# Patient Record
Sex: FEMALE | Race: WHITE | URBAN - METROPOLITAN AREA
[De-identification: names, ages, dates, MRNs, and addresses within clinical notes are randomized per-mention and may not be internally consistent; named-entity substitution may affect disease eponyms.]

---

## 2017-07-08 ENCOUNTER — EMERGENCY (EMERGENCY)
Facility: HOSPITAL | Age: 52
LOS: 1 days | Discharge: HOME | End: 2017-07-08
Attending: EMERGENCY MEDICINE
Payer: COMMERCIAL

## 2017-07-08 VITALS
RESPIRATION RATE: 16 BRPM | HEART RATE: 75 BPM | SYSTOLIC BLOOD PRESSURE: 157 MMHG | DIASTOLIC BLOOD PRESSURE: 87 MMHG | OXYGEN SATURATION: 97 % | TEMPERATURE: 99 F

## 2017-07-08 VITALS
HEART RATE: 75 BPM | OXYGEN SATURATION: 99 % | DIASTOLIC BLOOD PRESSURE: 77 MMHG | SYSTOLIC BLOOD PRESSURE: 129 MMHG | TEMPERATURE: 98 F | RESPIRATION RATE: 18 BRPM

## 2017-07-08 LAB
ALBUMIN SERPL ELPH-MCNC: 4.3 G/DL — SIGNIFICANT CHANGE UP (ref 3.3–5)
ALP SERPL-CCNC: 60 U/L — SIGNIFICANT CHANGE UP (ref 40–120)
ALT FLD-CCNC: 18 U/L RC — SIGNIFICANT CHANGE UP (ref 10–45)
ANION GAP SERPL CALC-SCNC: 12 MMOL/L — SIGNIFICANT CHANGE UP (ref 5–17)
APPEARANCE UR: CLEAR — SIGNIFICANT CHANGE UP
AST SERPL-CCNC: 21 U/L — SIGNIFICANT CHANGE UP (ref 10–40)
BASE EXCESS BLDV CALC-SCNC: 1.1 MMOL/L — SIGNIFICANT CHANGE UP (ref -2–2)
BASOPHILS # BLD AUTO: 0 K/UL — SIGNIFICANT CHANGE UP (ref 0–0.2)
BASOPHILS NFR BLD AUTO: 0.5 % — SIGNIFICANT CHANGE UP (ref 0–2)
BILIRUB SERPL-MCNC: 0.3 MG/DL — SIGNIFICANT CHANGE UP (ref 0.2–1.2)
BILIRUB UR-MCNC: NEGATIVE — SIGNIFICANT CHANGE UP
BUN SERPL-MCNC: 14 MG/DL — SIGNIFICANT CHANGE UP (ref 7–23)
CA-I SERPL-SCNC: 1.23 MMOL/L — SIGNIFICANT CHANGE UP (ref 1.12–1.3)
CALCIUM SERPL-MCNC: 9.5 MG/DL — SIGNIFICANT CHANGE UP (ref 8.4–10.5)
CHLORIDE BLDV-SCNC: 106 MMOL/L — SIGNIFICANT CHANGE UP (ref 96–108)
CHLORIDE SERPL-SCNC: 103 MMOL/L — SIGNIFICANT CHANGE UP (ref 96–108)
CO2 BLDV-SCNC: 29 MMOL/L — SIGNIFICANT CHANGE UP (ref 22–30)
CO2 SERPL-SCNC: 25 MMOL/L — SIGNIFICANT CHANGE UP (ref 22–31)
COLOR SPEC: YELLOW — SIGNIFICANT CHANGE UP
CREAT SERPL-MCNC: 0.89 MG/DL — SIGNIFICANT CHANGE UP (ref 0.5–1.3)
D DIMER BLD IA.RAPID-MCNC: <150 NG/ML DDU — SIGNIFICANT CHANGE UP
DIFF PNL FLD: NEGATIVE — SIGNIFICANT CHANGE UP
EOSINOPHIL # BLD AUTO: 0 K/UL — SIGNIFICANT CHANGE UP (ref 0–0.5)
EOSINOPHIL NFR BLD AUTO: 0.2 % — SIGNIFICANT CHANGE UP (ref 0–6)
EPI CELLS # UR: SIGNIFICANT CHANGE UP /HPF
GAS PNL BLDV: 139 MMOL/L — SIGNIFICANT CHANGE UP (ref 136–145)
GAS PNL BLDV: SIGNIFICANT CHANGE UP
GLUCOSE BLDV-MCNC: 95 MG/DL — SIGNIFICANT CHANGE UP (ref 70–99)
GLUCOSE SERPL-MCNC: 98 MG/DL — SIGNIFICANT CHANGE UP (ref 70–99)
GLUCOSE UR QL: NEGATIVE — SIGNIFICANT CHANGE UP
HCG UR QL: NEGATIVE — SIGNIFICANT CHANGE UP
HCO3 BLDV-SCNC: 27 MMOL/L — SIGNIFICANT CHANGE UP (ref 21–29)
HCT VFR BLD CALC: 43.8 % — SIGNIFICANT CHANGE UP (ref 34.5–45)
HCT VFR BLDA CALC: 45 % — SIGNIFICANT CHANGE UP (ref 39–50)
HGB BLD CALC-MCNC: 14.8 G/DL — SIGNIFICANT CHANGE UP (ref 11.5–15.5)
HGB BLD-MCNC: 14.2 G/DL — SIGNIFICANT CHANGE UP (ref 11.5–15.5)
KETONES UR-MCNC: NEGATIVE — SIGNIFICANT CHANGE UP
LACTATE BLDV-MCNC: 0.7 MMOL/L — SIGNIFICANT CHANGE UP (ref 0.7–2)
LEUKOCYTE ESTERASE UR-ACNC: ABNORMAL
LIDOCAIN IGE QN: 52 U/L — SIGNIFICANT CHANGE UP (ref 7–60)
LYMPHOCYTES # BLD AUTO: 1.3 K/UL — SIGNIFICANT CHANGE UP (ref 1–3.3)
LYMPHOCYTES # BLD AUTO: 15.2 % — SIGNIFICANT CHANGE UP (ref 13–44)
MCHC RBC-ENTMCNC: 30 PG — SIGNIFICANT CHANGE UP (ref 27–34)
MCHC RBC-ENTMCNC: 32.5 GM/DL — SIGNIFICANT CHANGE UP (ref 32–36)
MCV RBC AUTO: 92.4 FL — SIGNIFICANT CHANGE UP (ref 80–100)
MONOCYTES # BLD AUTO: 0.4 K/UL — SIGNIFICANT CHANGE UP (ref 0–0.9)
MONOCYTES NFR BLD AUTO: 4.3 % — SIGNIFICANT CHANGE UP (ref 2–14)
NEUTROPHILS # BLD AUTO: 7 K/UL — SIGNIFICANT CHANGE UP (ref 1.8–7.4)
NEUTROPHILS NFR BLD AUTO: 79.8 % — HIGH (ref 43–77)
NITRITE UR-MCNC: NEGATIVE — SIGNIFICANT CHANGE UP
PCO2 BLDV: 53 MMHG — HIGH (ref 35–50)
PH BLDV: 7.34 — LOW (ref 7.35–7.45)
PH UR: 6.5 — SIGNIFICANT CHANGE UP (ref 5–8)
PLATELET # BLD AUTO: 216 K/UL — SIGNIFICANT CHANGE UP (ref 150–400)
PO2 BLDV: 22 MMHG — LOW (ref 25–45)
POTASSIUM BLDV-SCNC: 3.8 MMOL/L — SIGNIFICANT CHANGE UP (ref 3.5–5)
POTASSIUM SERPL-MCNC: 4.1 MMOL/L — SIGNIFICANT CHANGE UP (ref 3.5–5.3)
POTASSIUM SERPL-SCNC: 4.1 MMOL/L — SIGNIFICANT CHANGE UP (ref 3.5–5.3)
PROT SERPL-MCNC: 7.7 G/DL — SIGNIFICANT CHANGE UP (ref 6–8.3)
PROT UR-MCNC: NEGATIVE — SIGNIFICANT CHANGE UP
RBC # BLD: 4.74 M/UL — SIGNIFICANT CHANGE UP (ref 3.8–5.2)
RBC # FLD: 11.7 % — SIGNIFICANT CHANGE UP (ref 10.3–14.5)
RBC CASTS # UR COMP ASSIST: SIGNIFICANT CHANGE UP /HPF (ref 0–2)
SAO2 % BLDV: 28 % — LOW (ref 67–88)
SODIUM SERPL-SCNC: 140 MMOL/L — SIGNIFICANT CHANGE UP (ref 135–145)
SP GR SPEC: >1.03 — HIGH (ref 1.01–1.02)
UROBILINOGEN FLD QL: NEGATIVE — SIGNIFICANT CHANGE UP
WBC # BLD: 8.7 K/UL — SIGNIFICANT CHANGE UP (ref 3.8–10.5)
WBC # FLD AUTO: 8.7 K/UL — SIGNIFICANT CHANGE UP (ref 3.8–10.5)
WBC UR QL: SIGNIFICANT CHANGE UP /HPF (ref 0–5)

## 2017-07-08 PROCEDURE — 74177 CT ABD & PELVIS W/CONTRAST: CPT | Mod: 26

## 2017-07-08 PROCEDURE — 99285 EMERGENCY DEPT VISIT HI MDM: CPT

## 2017-07-08 RX ORDER — ACETAMINOPHEN 500 MG
975 TABLET ORAL ONCE
Refills: 0 | Status: COMPLETED | OUTPATIENT
Start: 2017-07-08 | End: 2017-07-08

## 2017-07-08 RX ORDER — ACETAMINOPHEN 500 MG
1000 TABLET ORAL ONCE
Refills: 0 | Status: DISCONTINUED | OUTPATIENT
Start: 2017-07-08 | End: 2017-07-08

## 2017-07-08 RX ADMIN — Medication 975 MILLIGRAM(S): at 13:57

## 2017-07-08 RX ADMIN — Medication 975 MILLIGRAM(S): at 12:44

## 2017-07-08 NOTE — ED PROVIDER NOTE - CARE PLAN
Principal Discharge DX:	Left upper quadrant abdominal tenderness  Instructions for follow-up, activity and diet:	1. You may take Motrin 600mg every 6 hours or Tylenol 650mg every 6 hours as needed for pain.  2. Follow up with your Primary Care Physician as soon as possible for further evaluation.   3. Return to the Emergency Department for any concerning symptoms.

## 2017-07-08 NOTE — ED PROVIDER NOTE - PLAN OF CARE
1. You may take Motrin 600mg every 6 hours or Tylenol 650mg every 6 hours as needed for pain.  2. Follow up with your Primary Care Physician as soon as possible for further evaluation.   3. Return to the Emergency Department for any concerning symptoms.

## 2017-07-08 NOTE — ED PROVIDER NOTE - ATTENDING CONTRIBUTION TO CARE
Private Physician Abdoulaye Acharya  52y female No allergy,habits, Taking ocp, mvi, Pt complains of severe left flank pain past 10d, No nvdc. Worsened overnight., no fever chills. Has not sought care until today. Seen urgent care and referred to ed. PE WDWN female awake alert speech fluent normocephalic atraumatic chest clear anterior & posterior abd Mod ttp left upper quad. +bs no rebound, rash, No cvat. Neuro focal defects  Ivan Sanderson MD, Facep

## 2017-07-08 NOTE — ED PROVIDER NOTE - MEDICAL DECISION MAKING DETAILS
Subacute luq abd pain with tenderness ro splenic lesion, diverticuola dz. Less likely lower lobe pe if dimer neg can exclude this dx. Check labs ct re-eval.  Ivan Sanderson MD, Facep

## 2017-07-08 NOTE — ED ADULT NURSE NOTE - CHPI ED SYMPTOMS NEG
no abdominal distension/no blood in stool/no diarrhea/no dysuria/no nausea/no vomiting/no burning urination

## 2017-07-08 NOTE — ED ADULT NURSE NOTE - OBJECTIVE STATEMENT
51 y/o female arrived to ED c.o LUQ pain x10 days. Pt states that she has had this consistent pain but it worsened last night into this morning. Was seen in urgent care and sent to ED for further eval. Pt arrived a&ox4, tearful, neg fever/ chills,  neg sob, L/S clear bilat, neg chest pain, abd soft and tender in LUQ, + sharp pain, worse with movement, pain relieved when sitting up, neg change in urinary or bowel pattern,  neg nausea/ vomiting, pulse motor sensoryx4, skin warm dry intact 18g IV placed LAC. Will continue to monitor.

## 2017-07-08 NOTE — CONSULT NOTE ADULT - SUBJECTIVE AND OBJECTIVE BOX
CC: Patient is a 52y old female who presents with a chief complaint of abdominal pain x 10 days.      Patient is a 52 year old female with history of gastritis p/w LUQ pain. The pt reports symptoms over the past 10 days, worsening over the past 2-3 days, worsening today and is now severe. Pain is in the left upper quadrant/left lower ribcage, positional. Denies having similar pain in the past. Not associated with any fevers, nausea/vomiting or change in bowel habits. Patient notes pain with bearing down. The patient reports having URI/bronchitis 8 weeks ago for which she was on antibiotics, steroids. Seen at Urgent Care this morning and reportedly had a negative chest xray but was sent in with concern for splenic pathology. Denies trauma, but has been playing tennis recently, but stopped 2-3 days ago when pain got worse. Denies dysuria.    PMH  Polyp of colon 2014  Bronchitis 5/2017    PSH  cervical polyp excision 2013, 2016  L foot fracture 2005  R foot avulsion 2017    MEDS    Allergies    No Known Allergies    Intolerances        Social  Denies tobacco  Rare EtOH (1 drink/month)  Denies other illicits  Part-time teacher/      Physical Exam  T(C): 36.7 (07-08-17 @ 16:56), Max: 37.2 (07-08-17 @ 12:17)  HR: 62 (07-08-17 @ 16:56) (62 - 75)  BP: 115/78 (07-08-17 @ 16:56) (115/78 - 157/87)  RR: 18 (07-08-17 @ 16:56) (16 - 18)  SpO2: 100% (07-08-17 @ 16:56) (97% - 100%)  Wt(kg): --  Tmax: T(C): , Max: 37.2 (07-08-17 @ 12:17)  Wt(kg): --    Gen: NAD  HEENT: normocephalic, atraumatic, no scleral icterus  CV: S1, S2, RRR  Pulm: CTA B/L  Abd: Soft, ND, LUQ tender, no rebound, no palpable organomegaly/masses, other quadrants benign  Ext: warm, no edema, palp dp/pt      Labs:                        14.2   8.7   )-----------( 216      ( 08 Jul 2017 12:42 )             43.8     07-08    140  |  103  |  14  ----------------------------<  98  4.1   |  25  |  0.89    Ca    9.5      08 Jul 2017 12:42    TPro  7.7  /  Alb  4.3  /  TBili  0.3  /  DBili  x   /  AST  21  /  ALT  18  /  AlkPhos  60  07-08      Urinalysis Basic - ( 08 Jul 2017 14:14 )    Color: Yellow / Appearance: Clear / SG: >1.030 / pH: x  Gluc: x / Ketone: Negative  / Bili: Negative / Urobili: Negative   Blood: x / Protein: Negative / Nitrite: Negative   Leuk Esterase: Moderate / RBC: 0-2 /HPF / WBC 3-5 /HPF   Sq Epi: x / Non Sq Epi: OCC /HPF / Bacteria: x            Imaging:  IMPRESSION:   Colonic diverticula without evidence of diverticulitis.

## 2017-07-08 NOTE — ED ADULT NURSE REASSESSMENT NOTE - NS ED NURSE REASSESS COMMENT FT1
pt sleeping. states she is comfortable without movement. pt declining pain medication. provided with hot packs. VSS will continue to monitor.

## 2017-07-08 NOTE — ED PROVIDER NOTE - OBJECTIVE STATEMENT
52 y.o. female no PMHx p/w LUQ pain. Reports symptoms over the past 10 days, worsening over the last 24 hours 52 y.o. female no PMHx p/w LUQ pain. Reports symptoms over the past 10 days, worsening over the last 24 hours and is now severe. Localizes pain to the left upper quadrant/left lower ribcage. Pain is positional, worse with deep inspiration. She recovered from a URI several weeks ago for which she was on antibiotics. Seen at Urgent Care this morning and reportedly had a negative chest xray but was sent in with concern for splenic pathology. Denies fevers, chills, injury/trauma, dysuria, hematuria, nausea/vomiting.

## 2017-07-08 NOTE — ED PROVIDER NOTE - PROGRESS NOTE DETAILS
Patient refusing GI cocktail or other pain medication at this time. Awaiting surgery consult. Leonardo Peña PA-C. Ethiology  of symptoms not clear, Declined gi cocktail. Persistent ttp luq surg consult pending  Ivan Sanderson MD, Facep Patient seen at bedside by Surgery Attn Dr. Ulloa, stating patient is cleared for discharge from surgical standpoint. Will discuss w/ Dr. Noyola. Leonardo Peña PA-C.

## 2017-07-08 NOTE — CONSULT NOTE ADULT - ASSESSMENT
52 year old female with PMH gastritis, presents with 10 days of LUQ pain, no other GI symptoms, unlike previous gastritis pain, tender on exam, but normal WBC, and normal imaging on CT scan, unclear etiology of pain  - no acute surgical intervention indicated  - supportive care-pain control  - recommend GI f/u for history of gastritis  - will discuss with attending Artemio

## 2017-07-12 ENCOUNTER — TRANSCRIPTION ENCOUNTER (OUTPATIENT)
Age: 52
End: 2017-07-12

## 2018-03-16 ENCOUNTER — EMERGENCY (EMERGENCY)
Facility: HOSPITAL | Age: 53
LOS: 1 days | Discharge: HOME | End: 2018-03-16
Attending: INTERNAL MEDICINE | Admitting: INTERNAL MEDICINE
Payer: COMMERCIAL

## 2018-03-16 VITALS
HEIGHT: 65 IN | WEIGHT: 115.08 LBS | HEART RATE: 87 BPM | TEMPERATURE: 98 F | OXYGEN SATURATION: 99 % | RESPIRATION RATE: 16 BRPM | DIASTOLIC BLOOD PRESSURE: 82 MMHG | SYSTOLIC BLOOD PRESSURE: 158 MMHG

## 2018-03-16 PROCEDURE — 73110 X-RAY EXAM OF WRIST: CPT | Mod: 26,LT

## 2018-03-16 PROCEDURE — 73090 X-RAY EXAM OF FOREARM: CPT | Mod: 26,LT

## 2018-03-16 PROCEDURE — 99284 EMERGENCY DEPT VISIT MOD MDM: CPT

## 2018-03-16 NOTE — ED ADULT NURSE REASSESSMENT NOTE - NS ED NURSE REASSESS COMMENT FT1
Lt wrist cast applied and tolerating well, Patient reviewed discharge instruction and medication with RN, follow up information given, Patient understood and ambulated to dc.

## 2018-03-16 NOTE — ED ADULT NURSE NOTE - OBJECTIVE STATEMENT
Called back with verbal auth of orders.  --------------------------------------------------        ----- Message from Danielito Carias sent at 5/26/2017  1:14 PM CDT -----  Regarding: Verbal Order   Contact: [phone]  Tsering from [facility]    Requesting verbal order to continue weekly skilled nursing for the next 9 weeks, with 2 PRN visits.      Pt received awake, sitting on chair holding her left arm. Pt states she was playing tennis when she fell backward and attempted to break her fall with her left hand. Pt with limited hand and finger movement secondary to pain, sensation intact, no swelling of hand of fingers. Pt has wrist tenderness, severe pain with flexion attempt, swelling noted on the top and bottom of her wrist. Pt took Aleeve prior to arrival and is applying ice with a compression device.

## 2018-12-08 ENCOUNTER — EMERGENCY (EMERGENCY)
Facility: HOSPITAL | Age: 53
LOS: 1 days | End: 2018-12-08
Attending: EMERGENCY MEDICINE | Admitting: EMERGENCY MEDICINE
Payer: COMMERCIAL

## 2018-12-08 VITALS
WEIGHT: 119.93 LBS | RESPIRATION RATE: 16 BRPM | HEIGHT: 65 IN | OXYGEN SATURATION: 100 % | TEMPERATURE: 98 F | HEART RATE: 71 BPM | SYSTOLIC BLOOD PRESSURE: 138 MMHG | DIASTOLIC BLOOD PRESSURE: 82 MMHG

## 2018-12-08 VITALS
SYSTOLIC BLOOD PRESSURE: 122 MMHG | HEART RATE: 62 BPM | TEMPERATURE: 98 F | RESPIRATION RATE: 16 BRPM | OXYGEN SATURATION: 98 % | DIASTOLIC BLOOD PRESSURE: 65 MMHG

## 2018-12-08 PROCEDURE — 29515 APPLICATION SHORT LEG SPLINT: CPT

## 2018-12-08 PROCEDURE — 99283 EMERGENCY DEPT VISIT LOW MDM: CPT | Mod: 25

## 2018-12-08 PROCEDURE — 73630 X-RAY EXAM OF FOOT: CPT | Mod: 26,RT

## 2018-12-08 NOTE — ED PROVIDER NOTE - MEDICAL DECISION MAKING DETAILS
52 Y/O F WITH R FOOT PAIN S/P MISSED A STEP AT HOME AND HYPERFLEXING R FOOT/ANKLE, WILL GET XRAY OF FOOT, RE-ASSESS, SPLINT AND CRUTCHES IF FX, F/U ORTHO/PODIATRY.

## 2018-12-08 NOTE — ED PROVIDER NOTE - MUSCULOSKELETAL, MLM
R leg: +ttp dorsal aspect of R forefoot with mild bruising and swelling, skin intact, toes warm & mobile, cap refill<2sec, pulses and sensation intact, NVI, R ankle/tib-fib NT with FROM, achilles NT and intact, Spine appears normal

## 2018-12-08 NOTE — ED PROVIDER NOTE - PROGRESS NOTE DETAILS
Pt examined by ED attending, Dr. Canales who agreed with disposition and plan. X-rays reviewed with Dr. Canales. Pt with lateral R cuboid fracture. R foot placed in posterior splint, crutches given, pt to f/u with pvt ortho, Dr. Palomares.

## 2018-12-08 NOTE — ED PROVIDER NOTE - OBJECTIVE STATEMENT
52 y/o F presents with c/o R foot pain x 1 hour. Pt states that she missed a step in her house and hyperflexed her R foot and has been having pain since. Pt denies open wounds, numbness, tingling, head trauma, other injuries/symptoms. States that she took advil PTA and does not want any pain meds now.

## 2018-12-08 NOTE — ED PROVIDER NOTE - ATTENDING CONTRIBUTION TO CARE
Kalyan Canales MD: I have personally performed a face to face diagnostic evaluation on this patient.  I have reviewed the PA note and agree with the history, exam, and plan of care, except as noted.  History and Exam by me shows same findings as documented  Attending Note: Patient hyperflexed foot. Pain over dorsum of forefoot. Agree with plan

## 2018-12-08 NOTE — ED ADULT NURSE NOTE - OBJECTIVE STATEMENT
Pt states she missed a step and injured right ankle and foot prior to arrival. tenderness noted right ankle and foot. Swelling noted dorsum right foot. Positive pedal pulse.

## 2019-03-13 ENCOUNTER — TRANSCRIPTION ENCOUNTER (OUTPATIENT)
Age: 54
End: 2019-03-13

## 2019-10-07 ENCOUNTER — EMERGENCY (EMERGENCY)
Facility: HOSPITAL | Age: 54
LOS: 0 days | Discharge: HOME | End: 2019-10-07
Attending: EMERGENCY MEDICINE
Payer: COMMERCIAL

## 2019-10-07 VITALS
SYSTOLIC BLOOD PRESSURE: 153 MMHG | HEIGHT: 65 IN | HEART RATE: 66 BPM | RESPIRATION RATE: 18 BRPM | WEIGHT: 119.93 LBS | TEMPERATURE: 99 F | DIASTOLIC BLOOD PRESSURE: 91 MMHG | OXYGEN SATURATION: 100 %

## 2019-10-07 DIAGNOSIS — S01.01XA LACERATION WITHOUT FOREIGN BODY OF SCALP, INITIAL ENCOUNTER: ICD-10-CM
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
## 2019-10-07 PROCEDURE — 12001 RPR S/N/AX/GEN/TRNK 2.5CM/<: CPT

## 2019-10-07 PROCEDURE — 99283 EMERGENCY DEPT VISIT LOW MDM: CPT | Mod: 25

## 2019-10-07 PROCEDURE — 99283 EMERGENCY DEPT VISIT LOW MDM: CPT

## 2019-10-07 NOTE — ED STATDOCS - NSFOLLOWUPINSTRUCTIONS_ED_ALL_ED_FT
Laceration    WHAT YOU NEED TO KNOW:    A laceration is an injury to the skin and the soft tissue underneath it. Lacerations happen when you are cut or hit by something. They can happen anywhere on the body.     DISCHARGE INSTRUCTIONS:    Return to the emergency department if:     You have heavy bleeding or bleeding that does not stop after 10 minutes of holding firm, direct pressure over the wound.       Your wound opens up.     Contact your healthcare provider if:     You have a fever or chills.       Your laceration is red, warm, or swollen.      You have red streaks on your skin coming from your wound.      You have white or yellow drainage from the wound that smells bad.      You have pain that gets worse, even after treatment.       You have questions or concerns about your condition or care.     Medicines:     Prescription pain medicine may be given. Ask how to take this medicine safely.       Antibiotics help treat or prevent a bacterial infection.       Take your medicine as directed. Contact your healthcare provider if you think your medicine is not helping or if you have side effects. Tell him or her if you are allergic to any medicine. Keep a list of the medicines, vitamins, and herbs you take. Include the amounts, and when and why you take them. Bring the list or the pill bottles to follow-up visits. Carry your medicine list with you in case of an emergency.    Care for your wound as directed:     Do not get your wound wet until your healthcare provider says it is okay. Do not soak your wound in water. Do not go swimming until your healthcare provider says it is okay. Carefully wash the wound with soap and water. Gently pat the area dry or allow it to air dry.       Change your bandages when they get wet, dirty, or after washing. Apply new, clean bandages as directed. Do not apply elastic bandages or tape too tight. Do not put powders or lotions over your incision.       Apply antibiotic ointment as directed. Your healthcare provider may give you antibiotic ointment to put over your wound if you have stitches. If you have strips of tape over your incision, let them dry up and fall off on their own. If they do not fall off within 14 days, gently remove them. If you have glue over your wound, do not remove or pick at it. If your glue comes off, do not replace it with glue that you have at home.       Check your wound every day for signs of infection such as swelling, redness, or pus.     Self-care:     Apply ice on your wound for 15 to 20 minutes every hour or as directed. Use an ice pack, or put crushed ice in a plastic bag. Cover it with a towel. Ice helps prevent tissue damage and decreases swelling and pain.      Use a splint as directed. A splint will decrease movement and stress on your wound. It may help it heal faster. A splint may be used for lacerations over joints or areas of your body that bend. Ask your healthcare provider how to apply and remove a splint.       Decrease scarring of your wound by applying ointments as directed. Do not apply ointments until your healthcare provider says it is okay. You may need to wait until your wound is healed. Ask which ointment to buy and how often to use it. After your wound is healed, use sunscreen over the area when you are out in the sun. You should do this for at least 6 months to 1 year after your injury.     Follow up with your healthcare provider as directed: You may need to follow up in 24 to 48 hours to have your wound checked for infection. You will need to return in 7-10 days if you have stitches or staples so they can be removed. Care for your wound as directed to prevent infection and help it heal. Write down your questions so you remember to ask them during your visits.

## 2019-10-07 NOTE — ED STATDOCS - PATIENT PORTAL LINK FT
You can access the FollowMyHealth Patient Portal offered by [facility] by registering at the following website: [email or web address]. By joining [employer]’s FollowMyHealth portal, you will also be able to view your health information using other applications (apps) compatible with our system.

## 2019-10-07 NOTE — ED STATDOCS - PROGRESS NOTE DETAILS
55 y/o F presents with laceration. pt states she stood up from picking something up and hit her head on a cabinet. Not on blood thinners. Denies loc, n/v, HA, dizziness, visual changes or other complaints at this time.   Head: 2cm laceration to R parietal. No bogginess no tenderness. laceration repaired by NP student, wound care instructions discussed with pt. Pt will FU with PMD. -Andres Alcala PA-C

## 2019-10-07 NOTE — ED STATDOCS - CLINICAL SUMMARY MEDICAL DECISION MAKING FREE TEXT BOX
Pt presents to the ED with laceration to top of head. Risks and benefits of CT discussed with pt, pt declines. Tetanus UTD. Plan: staple.

## 2019-10-07 NOTE — ED STATDOCS - OBJECTIVE STATEMENT
53 y/o female with PMHx presents to the ED c/o laceration today. States she was picking something up from the ground, and when she got up, she hit th top of her head onto a cabinet. No LOC. Now c/o headache. Denies nausea, dizziness. Tetanus UTD.

## 2019-10-07 NOTE — ED STATDOCS - ATTENDING CONTRIBUTION TO CARE
Attending Contribution to Care: I, Andressa Lebron, performed the initial face to face bedside interview with this patient regarding history of present illness, review of symptoms and relevant past medical, social and family history.  I completed an independent physical examination.  I was the initial provider who evaluated this patient and the history, physical, and MDM reflect this intial assessment. I have signed out the follow up of any pending tests after the original (i.e. labs, radiological studies) to the ACP with instructions to review any with instructions to review any concerning findings to me prior to discharge.  I have communicated the patient’s plan of care and disposition with the ACP.

## 2019-10-17 ENCOUNTER — EMERGENCY (EMERGENCY)
Facility: HOSPITAL | Age: 54
LOS: 0 days | Discharge: HOME | End: 2019-10-17
Attending: EMERGENCY MEDICINE
Payer: COMMERCIAL

## 2019-10-17 VITALS
OXYGEN SATURATION: 99 % | DIASTOLIC BLOOD PRESSURE: 70 MMHG | SYSTOLIC BLOOD PRESSURE: 126 MMHG | RESPIRATION RATE: 18 BRPM | TEMPERATURE: 98 F | HEART RATE: 60 BPM

## 2019-10-17 VITALS — WEIGHT: 119.93 LBS

## 2019-10-17 DIAGNOSIS — S01.01XD LACERATION WITHOUT FOREIGN BODY OF SCALP, SUBSEQUENT ENCOUNTER: ICD-10-CM
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
## 2019-10-17 PROCEDURE — G0463: CPT

## 2019-10-17 NOTE — ED STATDOCS - PATIENT PORTAL LINK FT
You can access the FollowMyHealth Patient Portal offered by [facility] by registering at the following website: [email or web address]. By joining [employer]’s FollowMyHealth portal, you will also be able to view your health information using other applications (apps) compatible with our system.

## 2019-10-17 NOTE — ED STATDOCS - ATTENDING CONTRIBUTION TO CARE
I, Andrei Momin MD,  performed the initial face to face bedside interview with this patient regarding history of present illness, review of symptoms and relevant past medical, social and family history.  I completed an independent physical examination.  I was the initial provider who evaluated this patient. I have signed out the follow up of any pending tests (i.e. labs, radiological studies) to the ACP.  I have communicated the patient’s plan of care and disposition with the ACP.  The history, relevant review of systems, past medical and surgical history, medical decision making, and physical examination was documented by the scribe in my presence and I attest to the accuracy of the documentation.

## 2019-10-17 NOTE — ED STATDOCS - PROGRESS NOTE DETAILS
54 yr. old female PMH: presents to ED with request for staple removal from right side of head. Stapled on 10/7. No fever or chills. No signs of infection. Seen and examined by attending in intake. Plan: Staple removal. Will F/U with patient. Lucia GRACIA

## 2019-10-17 NOTE — ED STATDOCS - SKIN, MLM
skin normal color for race, warm, dry and intact. laceration right top of head, area scabbed over no active bleeding, healed.

## 2019-10-17 NOTE — ED STATDOCS - OBJECTIVE STATEMENT
55 y/o female with PMHx of Bronchitis and polyp of colon removed via colonoscopy presents to the ED for staple removal from right parietal. Pt hit her head on a cabinet on 10/7/2019 and had the staples placed then in [facility]. No fever. NKDA. 53 y/o female with PMHx of Bronchitis and polyp of colon removed via colonoscopy presents to the ED for staple removal from right parietal. Pt hit her head on a cabinet on 10/7/2019 and had the staples placed then in [facility]. No fever or HA. NKDA.

## 2020-03-23 ENCOUNTER — EMERGENCY (EMERGENCY)
Facility: HOSPITAL | Age: 55
LOS: 0 days | Discharge: HOME | End: 2020-03-23
Payer: COMMERCIAL

## 2020-03-23 VITALS
HEART RATE: 90 BPM | RESPIRATION RATE: 19 BRPM | DIASTOLIC BLOOD PRESSURE: 72 MMHG | OXYGEN SATURATION: 99 % | SYSTOLIC BLOOD PRESSURE: 122 MMHG | TEMPERATURE: 101 F

## 2020-03-23 DIAGNOSIS — B97.29 OTHER CORONAVIRUS AS THE CAUSE OF DISEASES CLASSIFIED ELSEWHERE: ICD-10-CM

## 2020-03-23 DIAGNOSIS — R50.9 FEVER, UNSPECIFIED: ICD-10-CM

## 2020-03-23 DIAGNOSIS — R05 COUGH: ICD-10-CM

## 2020-03-23 DIAGNOSIS — R07.0 PAIN IN THROAT: ICD-10-CM

## 2020-03-23 DIAGNOSIS — R09.89 OTHER SPECIFIED SYMPTOMS AND SIGNS INVOLVING THE CIRCULATORY AND RESPIRATORY SYSTEMS: ICD-10-CM

## 2020-03-23 PROCEDURE — 99283 EMERGENCY DEPT VISIT LOW MDM: CPT

## 2020-03-23 PROCEDURE — U0001: CPT

## 2020-03-23 NOTE — ED STATDOCS - CHPI ED SYMPTOM NEG
no hematuria/no decreased eating/drinking/no dysuria/no pain/no vomiting/no weakness/no palpitations

## 2020-03-23 NOTE — ED STATDOCS - PATIENT PORTAL LINK FT
You can access the FollowMyHealth Patient Portal offered by [facility] by registering at the following website: [email or web address]. By joining [employer]’s FollowMyHealth portal, you will also be able to view your health information using other applications (apps) compatible with our system.

## 2020-03-23 NOTE — ED STATDOCS - OBJECTIVE STATEMENT
pt presented to ed with c/c of chills, cough, bodyaches., runny nose, sore throat x 3 days.    pt states she has been taking motrin and tylenol for symptoms. pt denies any chest pain, sob, dyspnea, numbness, tingling abdominal pain, nausea, vomiting or any other complaints.  Pt recently exposed to COVID-19. Pt here for testing.

## 2020-03-23 NOTE — ED ADULT TRIAGE NOTE - CHIEF COMPLAINT QUOTE
Patient presents with fever cough and chill. Recent exposure to Covid. Requesting testing. Patient presents with fever cough and chills. Recent exposure to Covid. Requesting testing.

## 2020-03-23 NOTE — ED STATDOCS - CLINICAL SUMMARY MEDICAL DECISION MAKING FREE TEXT BOX
pt presented to ed with c/c of chills, cough, bodyaches., runny nose x 3 days.    pt states she has been taking motrin and tylenol for symptoms. pt denies any chest pain, sob, dyspnea, numbness, tingling abdominal pain, nausea, vomiting or any other complaints.  Pt recently exposed to COVID-19. Pt here for testing. pt presented to ed with c/c of chills, cough, bodyaches., runny nose x 3 days.    pt states she has been taking tylenol for symptoms. pt denies any chest pain, sob, dyspnea, numbness, tingling abdominal pain, nausea, vomiting or any other complaints.  Pt recently exposed to COVID-19. Pt here for testing.

## 2020-03-24 LAB — SARS-COV-2 RNA SPEC QL NAA+PROBE: DETECTED

## 2021-07-08 ENCOUNTER — APPOINTMENT (OUTPATIENT)
Dept: ORTHOPEDIC SURGERY | Facility: CLINIC | Age: 56
End: 2021-07-08
Payer: COMMERCIAL

## 2021-07-08 ENCOUNTER — NON-APPOINTMENT (OUTPATIENT)
Age: 56
End: 2021-07-08

## 2021-07-08 VITALS
HEIGHT: 65 IN | BODY MASS INDEX: 19.16 KG/M2 | WEIGHT: 115 LBS | SYSTOLIC BLOOD PRESSURE: 121 MMHG | DIASTOLIC BLOOD PRESSURE: 75 MMHG | HEART RATE: 80 BPM

## 2021-07-08 PROBLEM — Z00.00 ENCOUNTER FOR PREVENTIVE HEALTH EXAMINATION: Status: ACTIVE | Noted: 2021-07-08

## 2021-07-08 PROCEDURE — 99204 OFFICE O/P NEW MOD 45 MIN: CPT

## 2021-07-08 PROCEDURE — 99072 ADDL SUPL MATRL&STAF TM PHE: CPT

## 2021-07-08 PROCEDURE — 73630 X-RAY EXAM OF FOOT: CPT | Mod: RT

## 2021-07-08 RX ORDER — NAPROXEN 500 MG/1
500 TABLET, DELAYED RELEASE ORAL
Qty: 60 | Refills: 2 | Status: ACTIVE | COMMUNITY
Start: 2021-07-08 | End: 1900-01-01

## 2021-07-08 NOTE — PHYSICAL EXAM
[de-identified] : Right foot Physical Examination:\par \par General: Alert and oriented x3.  In no acute distress.  Pleasant in nature with a normal affect.  No apparent respiratory distress. \par Erythema, Warmth, Rubor: Negative\par Swelling: No swelling present\par \par ROM Ankle:\par 1. Dorsiflexion: 10 degrees\par 2. Plantarflexion: 40 degrees\par 3. Inversion: 20 degrees\par 4. Eversion: 10 degrees\par \par ROM of digits: Normal\par \par Pes Planus: Negative\par Pes Cavus: Negative\par \par Bunion: Negative\par Kenyatta's Bunion (Bunionette): Negative\par Hammer Toe Deformity/Deformities: Negative\par \par Tenderness to Palpation: \par 1. Heel Pain: Negative\par 2. Midfoot Pain: Negative\par 3. First MTP Joint: Negative\par 4. Lis Franc Joint: Negative\par \par Tenderness Metatarsals:\par 1st MT: Negative\par 2nd MT: Negative\par 3rd MT: Negative\par 4th MT: Negative\par 5th MT: Negative\par Base of the 5th MT: Negative\par \par Ligament Pain:\par 1. Lis Franc Ligament: Negative\par 2. Plantar Fascia Ligament: Severe pain when palpating the plantar fascia in her mid arch and at the attachment to the calcaneus.\par \par Strength: \par 5/5 TA/GS/EHL/FHL/EDL/ADD/ABD\par \par Pulses: 2+ DP/PT Pulses\par \par Capillary Refill Toes: <2 seconds\par \par Neuro: Intact motor and sensory throughout\par \par Additional Test:\par 1. Rose's Squeeze Test: Negative\par 2. Calcaneal Squeeze Test: Negative [de-identified] : X-rays of the right foot reviewed, 7/8/2021: No fractures present.  Small heel spur noted.

## 2021-07-08 NOTE — HISTORY OF PRESENT ILLNESS
[FreeTextEntry1] : The patient is a 56-year-old female who presents with an acute onset of right foot pain which starts in her arch and goes to the bottom portion of her heel.  This past Friday she was playing tennis and after she played tennis the bottom portion of her foot, specifically the plantar fascia ligament caused her severe pain.  The patient is walking with a limp today in the office, 7 out of 10 pain, wearing regular sneakers.  She has no numbness to the foot.  No other complaints.

## 2021-07-08 NOTE — DISCUSSION/SUMMARY
[de-identified] : Assessment: Right foot Plantar Fasciitis versus plantar fascia tear.\par \par Plan:\par #1. Anti-inflammatories/Tylenol as needed for pain.\par #2. Home stretching program/physical therapy prescription given.\par #3. Dr. Rousseau's insoles/gel heel cups.\par #4. Epsom Salt Baths daily.\par #5. Dorsal Night Splint.  Use as instructed/as directed. \par #4. Follow up in 2-3 weeks.\par #5. All questions answered.  The patient understood the treatment plan above.  If the patient continues to have pain in 2 weeks when she returns to the office, consider MRI of the right foot.\par \par At this time I would like the patient to offload the foot with a short cam boot for the next 2 weeks.  The patient does not have to rest or sleep in the boot.  This is to get the bottom portion of her foot/ligament some time to heal over the next 2 weeks.  She can fully weight-bear with the boot.  I would also like her to follow the rules below in regards to her plantar fasciitis pain.

## 2021-07-22 ENCOUNTER — APPOINTMENT (OUTPATIENT)
Dept: ORTHOPEDIC SURGERY | Facility: CLINIC | Age: 56
End: 2021-07-22
Payer: COMMERCIAL

## 2021-07-22 PROCEDURE — 99072 ADDL SUPL MATRL&STAF TM PHE: CPT

## 2021-07-22 PROCEDURE — 99213 OFFICE O/P EST LOW 20 MIN: CPT

## 2021-07-22 NOTE — DISCUSSION/SUMMARY
[de-identified] : Assessment: Right foot Plantar Fasciitis versus plantar fascia tear.\par \par Plan:\par #1. Anti-inflammatories/Tylenol as needed for pain.\par #2. I recommend the patient undergo a course of physical therapy for the right foot  2-3 times a week for a total of 6-8 weeks. A prescription was given for the physical therapy today. I recommend that the patient continue to perform a home exercise program for the lower extremities.		\par #3. Dr. Rousseau's insoles/gel heel cups. A discussion was had about utilizing custom orthotics. The patient was educated about custom orthotics in the office today.\par #4. Epsom Salt Baths daily.\par #5. Follow up on an as-needed basis.\par #6. All questions answered.  The patient understood the treatment plan above.

## 2021-07-22 NOTE — HISTORY OF PRESENT ILLNESS
[FreeTextEntry1] : 7/22/21: ROBIN LEWISLOMBARDI is a 56 year year old female presenting for a follow-up evaluation of right foot pain. The patient’s states that her pain has improved. The patient presents wearing a CAM boot.				\par \par \par 7/8/21: The patient is a 56-year-old female who presents with an acute onset of right foot pain which starts in her arch and goes to the bottom portion of her heel.  This past Friday she was playing tennis and after she played tennis the bottom portion of her foot, specifically the plantar fascia ligament caused her severe pain.  The patient is walking with a limp today in the office, 7 out of 10 pain, wearing regular sneakers.  She has no numbness to the foot.  No other complaints.

## 2021-07-22 NOTE — ADDENDUM
[FreeTextEntry1] : I, Jovana Farris, acted solely as a scribe for Dr. Merrick Gabriel on this date 07/22/2021.\par \par All medical record entries made by the Scribe were at my, Dr. Merrick Gabriel, direction and personally dictated by me on 07/22/2021 . I have reviewed the chart and agree that the record accurately reflects my personal performance of the history, physical exam, assessment and plan. I have also personally directed, reviewed, and agreed with the chart.	\par

## 2021-07-28 NOTE — ED ADULT NURSE REASSESSMENT NOTE - NS ED NURSE REASSESS COMMENT FT1
Afebrile vital signs stable. Posterior splint applied. Afebrile vital signs stable. Posterior splint applied. Demonstrated crutch walking well No

## 2022-02-07 ENCOUNTER — APPOINTMENT (OUTPATIENT)
Age: 57
End: 2022-02-07
Payer: COMMERCIAL

## 2022-02-07 DIAGNOSIS — S99.911A UNSPECIFIED INJURY OF RIGHT ANKLE, INITIAL ENCOUNTER: ICD-10-CM

## 2022-02-07 DIAGNOSIS — M72.2 PLANTAR FASCIAL FIBROMATOSIS: ICD-10-CM

## 2022-02-07 DIAGNOSIS — Q68.8 OTHER SPECIFIED CONGENITAL MUSCULOSKELETAL DEFORMITIES: ICD-10-CM

## 2022-02-07 PROCEDURE — 73630 X-RAY EXAM OF FOOT: CPT | Mod: RT

## 2022-02-07 PROCEDURE — 73610 X-RAY EXAM OF ANKLE: CPT | Mod: RT

## 2022-02-07 PROCEDURE — 99214 OFFICE O/P EST MOD 30 MIN: CPT

## 2022-02-07 NOTE — PHYSICAL EXAM
[de-identified] : Right foot Physical Examination:\par \par General: Alert and oriented x3.  In no acute distress.  Pleasant in nature with a normal affect.  No apparent respiratory distress. \par Erythema, Warmth, Rubor: Negative\par Swelling: No swelling present\par \par ROM Ankle:\par 1. Dorsiflexion: 10 degrees\par 2. Plantarflexion: 40 degrees\par 3. Inversion: 20 degrees\par 4. Eversion: 10 degrees\par \par ROM of digits: Normal\par \par Pes Planus: Negative\par Pes Cavus: Negative\par \par Bunion: Negative\par Kenyatta's Bunion (Bunionette): Negative\par Hammer Toe Deformity/Deformities: Negative\par \par Tenderness to Palpation: \par 1. Heel Pain: Negative\par 2. Midfoot Pain: Negative\par 3. First MTP Joint: Negative\par 4. Lis Franc Joint: Negative\par \par Tenderness Metatarsals:\par 1st MT: Negative\par 2nd MT: Negative\par 3rd MT: Negative\par 4th MT: Negative\par 5th MT: Negative\par Base of the 5th MT: Negative\par \par Ligament Pain:\par 1. Lis Franc Ligament: Negative\par 2. Plantar Fascia Ligament: +\par \par Strength: \par 5/5 TA/GS/EHL/FHL/EDL/ADD/ABD\par \par Pulses: 2+ DP/PT Pulses\par \par Capillary Refill Toes: <2 seconds\par \par Neuro: Intact motor and sensory throughout\par \par Additional Test:\par 1. Rose's Squeeze Test: Negative\par 2. Calcaneal Squeeze Test: Negative [de-identified] : X-rays of the right foot and ankle were ordered, obtained, and reviewed by me today, 02/07/2022, revealed: [place] adjacent to the cuboid. Accessory Os perineum. No other acute fractures. \par

## 2022-02-07 NOTE — HISTORY OF PRESENT ILLNESS
[FreeTextEntry1] : 2/7/22: The patient is here with right foot and ankle pain, lateral side.  She was climbing up a wall for fun about 6 weeks ago and started to have the pain i the foot and ankle. Of note, the patient reports pain with tennis and with compensation in her gait. Pain scale 3/10. She is walking with boots without assistance. No other complaints. \par \par 7/22/21: ROBIN LEWISLOMBARDI is a 56 year year old female presenting for a follow-up evaluation of right foot pain. The patient’s states that her pain has improved. The patient presents wearing a CAM boot.				\par \par 7/8/21: The patient is a 56-year-old female who presents with an acute onset of right foot pain which starts in her arch and goes to the bottom portion of her heel.  This past Friday she was playing tennis and after she played tennis the bottom portion of her foot, specifically the plantar fascia ligament caused her severe pain.  The patient is walking with a limp today in the office, 7 out of 10 pain, wearing regular sneakers.  She has no numbness to the foot.  No other complaints.

## 2022-02-07 NOTE — DISCUSSION/SUMMARY
[de-identified] : Today I had a lengthy discussion with the patient regarding their right ankle pain, plantar fasciitis of the right foot. I have addressed all the patient's concerns surrounding the pathology of their condition. XR imaging was completed in office today and results were reviewed with the patient. In order to provide the patient with a better understanding of their ailment, I educated them about the anatomy, physiology and lifespan of their condition using a foot model.\par \par At this time, the patient will continue to undergo conservative management. I recommend that the patient utilize Voltaren gel topically. If the Voltaren gel could not be obtained, Icy Hot, Biofreeze, or Bengay can be utilized instead. No other acute orthopedic intervention was deemed necessary at this time. The patient understood and verbally agreed to the treatment plan. All of their questions were answered and they were satisfied with the visit. The patient should call the office if they have any questions or experience worsening symptoms. I would like to see the patient back in the office in PRN to reassess their condition. 				\par

## 2022-02-07 NOTE — REVIEW OF SYSTEMS
[Joint Pain] : joint pain [Negative] : Heme/Lymph [FreeTextEntry9] : Right foot pain/Right ankle pain

## 2022-02-07 NOTE — ADDENDUM
[FreeTextEntry1] : I, Jovana Farris, acted solely as a scribe for Dr. Merrick Gabriel on this date 02/07/2022.\par \par All medical record entries made by the Scribe were at my, Dr. Merrick Gabriel, direction and personally dictated by me on 02/07/2022 . I have reviewed the chart and agree that the record accurately reflects my personal performance of the history, physical exam, assessment and plan. I have also personally directed, reviewed, and agreed with the chart.	\par

## 2022-05-30 RX ORDER — CHOLECALCIFEROL (VITAMIN D3) 125 MCG
2 CAPSULE ORAL
Qty: 0 | Refills: 0 | DISCHARGE

## 2022-11-10 NOTE — PHYSICAL EXAM
"Subjective   Usman Burrell is a 46 y.o. male.     History of Present Illness   Usman Burrell 46 y.o. male presents today for Emergency Room follow up.  he was treated 11-4-22 for ?angioedema.  Onset prior Monday 11-1-22.   .  I reviewed all of the labs and diagnostic testing and noted:  Labs, CXR, CT neck soft tissue  The patient's medications were changed:  Current outpatient and discharge medications have been reconciled for the patient.  Reviewed by: Esther Patel PA-C    he does have a follow up appointment with a specialist:     ER did not note angioedema. CT neck----showed tooth decay and retention cyst---maxillary------infx and cyst---need treat, Did take my Medrol  No lip edema, some SOA---anxiety and having panic attacks also  Feeling of dysphagia---tight like breathing; ---some GERD  Labs were fine.   Had Ativan injection and ? Steroid? Shot?    Held Mounjaro---no nausea  Had taken phentermine from weight loss clinic-----shaking and eye twitching--stopped it    Less GERD since stopped Mounjaro-----noted had break through while on this---  I noted that patient had started carvedilol from the weight loss clinic his blood pressure was slightly elevated... And added lisinopril last visit due to blood pressure not controlled and will not prescribe ACE or ARB in future due to concern of angioedema and already changed the ACE to amlodipine  He still having daily anxiety and panic attacks and the hydroxyzine is not effective.  Has been on Lexapro in past stopped taking due to fatigue and weight gain switch to Zoloft for few months and did help..  Still noted weight gain..    Usman Burrell male 46 y.o., /60 (BP Location: Left arm, Patient Position: Sitting, Cuff Size: Adult)   Pulse 99   Temp 97.5 °F (36.4 °C) (Oral)   Resp 16   Ht 68 cm (26.77\")   Wt 111 kg (244 lb)   SpO2 98%   .35 kg/m²   who presents today for follow up of Anxiety and Panic Attacks.  He reports anxiety, impaired concentration, " excessive worry and unable to relax. Onset of symptoms was approximately several months ago. He denies current suicidal and homicidal ideation. Risk factors are family history of anxiety and or depression and lifestyle of multiple roles.  Previous treatment includes was on Zoloft and did help. He complains of the following medication side effects:none. The patient declines to go to counseling..  The patient has read and signed the [facility] Controlled Substance Contract.  I will continue to see patient for regular follow up appointments.  They are well controlled on their medication.  SCOT has been reviewed by me and is updated every 3 months. The patient is aware of the potential for addiction and dependence.      The following portions of the patient's history were reviewed and updated as appropriate: allergies, current medications, past family history, past medical history, past social history, past surgical history and problem list.    Review of Systems   Constitutional: Negative for activity change, appetite change and unexpected weight change.   HENT: Negative for nosebleeds and trouble swallowing.    Eyes: Negative for pain and visual disturbance.   Respiratory: Negative for chest tightness, shortness of breath and wheezing.    Cardiovascular: Negative for chest pain and palpitations.   Gastrointestinal: Negative for abdominal pain and blood in stool.   Endocrine: Negative.    Genitourinary: Negative for difficulty urinating and hematuria.   Musculoskeletal: Negative for joint swelling.   Skin: Negative for color change and rash.   Allergic/Immunologic: Negative.    Neurological: Negative for syncope and speech difficulty.   Hematological: Negative for adenopathy.   Psychiatric/Behavioral: Negative for agitation and confusion. The patient is nervous/anxious.    All other systems reviewed and are negative.      Objective   Physical Exam  Vitals and nursing note reviewed.   Constitutional:       General:  He is not in acute distress.     Appearance: He is well-developed. He is not diaphoretic.   HENT:      Head: Normocephalic.      Right Ear: External ear normal.      Left Ear: External ear normal.   Eyes:      General: No scleral icterus.     Conjunctiva/sclera: Conjunctivae normal.      Pupils: Pupils are equal, round, and reactive to light.   Cardiovascular:      Rate and Rhythm: Normal rate and regular rhythm.      Heart sounds: Normal heart sounds. No murmur heard.  Pulmonary:      Effort: Pulmonary effort is normal.      Breath sounds: Normal breath sounds.   Musculoskeletal:         General: Normal range of motion.      Cervical back: Normal range of motion and neck supple.   Skin:     General: Skin is warm and dry.      Findings: No rash.   Neurological:      Mental Status: He is alert and oriented to person, place, and time.   Psychiatric:         Mood and Affect: Affect is not inappropriate.         Behavior: Behavior normal.         Thought Content: Thought content normal.         Judgment: Judgment normal.         Assessment & Plan   Diagnoses and all orders for this visit:    1. Acute recurrent maxillary sinusitis (Primary)    Other orders  -     azithromycin (ZITHROMAX) 250 MG tablet; Take 2 tablets the first day, then 1 tablet daily for 4 days for infection  Dispense: 6 tablet; Refill: 1  -     esomeprazole (nexIUM) 40 MG capsule; Take 1 capsule by mouth 2 (Two) Times a Day. for GERD  Dispense: 180 capsule; Refill: 1      Concerned that Wellbutrin from the weight loss clinic may be making anxiety worse and will discontinue  Consider restart Zoloft  I suspect he is had esophagitis from taking the mounjaro--- and was having breakthrough GERD and irritated his oropharyngeal area we will increase his Nexium to twice daily for the next month and then resume once daily--- if the sensation of dysphagia does not resolve in the next week will refer to GI or ENT  Do need to treat sinus infection that was  [de-identified] : Right foot Physical Examination:\par \par General: Alert and oriented x3.  In no acute distress.  Pleasant in nature with a normal affect.  No apparent respiratory distress. \par Erythema, Warmth, Rubor: Negative\par Swelling: No swelling present\par \par ROM Ankle:\par 1. Dorsiflexion: 10 degrees\par 2. Plantarflexion: 40 degrees\par 3. Inversion: 20 degrees\par 4. Eversion: 10 degrees\par \par ROM of digits: Normal\par \par Pes Planus: Negative\par Pes Cavus: Negative\par \par Bunion: Negative\par Kenyatta's Bunion (Bunionette): Negative\par Hammer Toe Deformity/Deformities: Negative\par \par Tenderness to Palpation: \par 1. Heel Pain: Negative\par 2. Midfoot Pain: Negative\par 3. First MTP Joint: Negative\par 4. Lis Franc Joint: Negative\par \par Tenderness Metatarsals:\par 1st MT: Negative\par 2nd MT: Negative\par 3rd MT: Negative\par 4th MT: Negative\par 5th MT: Negative\par Base of the 5th MT: Negative\par \par Ligament Pain:\par 1. Lis Franc Ligament: Negative\par 2. Plantar Fascia Ligament: Improved moderate pain when palpating the plantar fascia in her mid arch and at the attachment to the calcaneus.\par \par Strength: \par 5/5 TA/GS/EHL/FHL/EDL/ADD/ABD\par \par Pulses: 2+ DP/PT Pulses\par \par Capillary Refill Toes: <2 seconds\par \par Neuro: Intact motor and sensory throughout\par \par Additional Test:\par 1. Rose's Squeeze Test: Negative\par 2. Calcaneal Squeeze Test: Negative incidentally noted and have him see dentist for tooth decay... Can see ENT for sinus cyst  Still avoid ACE ARB's--- concerned this may have been angioedema  Already switched him to amlodipine---BP at goal today  No ETOH with Ativan     Answers for HPI/ROS submitted by the patient on 11/10/2022  What is the primary reason for your visit?: Other  Please describe your symptoms.: lump in throat  Have you had these symptoms before?: Yes  How long have you been having these symptoms?: 1-2 weeks       [de-identified] : No new imaging.

## 2023-05-22 PROBLEM — J40 BRONCHITIS, NOT SPECIFIED AS ACUTE OR CHRONIC: Chronic | Status: ACTIVE | Noted: 2017-07-08

## 2023-05-22 PROBLEM — K63.5 POLYP OF COLON: Chronic | Status: ACTIVE | Noted: 2017-07-08

## 2023-05-25 ENCOUNTER — APPOINTMENT (OUTPATIENT)
Dept: ORTHOPEDIC SURGERY | Facility: CLINIC | Age: 58
End: 2023-05-25
Payer: COMMERCIAL

## 2023-05-25 ENCOUNTER — NON-APPOINTMENT (OUTPATIENT)
Age: 58
End: 2023-05-25

## 2023-05-25 DIAGNOSIS — M25.562 PAIN IN LEFT KNEE: ICD-10-CM

## 2023-05-25 PROCEDURE — 99214 OFFICE O/P EST MOD 30 MIN: CPT | Mod: 25

## 2023-05-25 PROCEDURE — 73564 X-RAY EXAM KNEE 4 OR MORE: CPT | Mod: RT

## 2023-05-25 PROCEDURE — 20610 DRAIN/INJ JOINT/BURSA W/O US: CPT | Mod: LT

## 2023-05-25 NOTE — DISCUSSION/SUMMARY
[de-identified] : Left knee internal derangement concerning for meniscus tear.\par \par Extensive discussion of the natural history of this issue was had with the patient.  We discussed the treatment options focusing on conservative therapy which includes anti-inflammatories, physical therapy/home exercise, & activity modification.  \par Patient elected for steroid injection today.  We discussed that if the pain does not resolve or worsens she should let us know and we will get an MRI of her left knee.  Recommended anti-inflammatories such as meloxicam however patient declined.\par Patient will return if the pain returns or does not resolve.

## 2023-05-25 NOTE — PROCEDURE
[de-identified] : 5/25/2023–left knee injection - Steroid\par The risks, benefits, and alternatives of the injection were reviewed/discussed with the patient today in office and all of their questions were answered. The patient consented to the procedure. The inferolateral injection site was prepped with chloroprep.  Cold spray was utilized to numb the skin. Utilizing sterile technique, the knee was injected with 1 ml 40 mg methylprednisolone, 4 ml 1% Lidocaine, 5 mL 0.25% Bupivicaine. A sterile bandage was applied. The patient tolerated the procedure well and there were no complications.\par

## 2023-05-25 NOTE — HISTORY OF PRESENT ILLNESS
[de-identified] : 5/25/2023–patient presents with 4 to 6 weeks of left knee pain.  States she tripped a few times playing tennis and had some increased pain.  Pain is mostly in the posterior aspect of her knee.  Does have some numbness in the anterior aspect of her lower leg.  Denies radiating pain.  Stairs bother this.  She is not taking anything.  Denies any prior treatments.  Denies allergies anticoagulation tobacco use or past medical history.

## 2023-05-25 NOTE — PHYSICAL EXAM
[de-identified] : General Appearance: normal without acute distress\par Mental: Alert and oriented x 3\par Psych/affect: appropriate, cooperative\par Gait: Mildly antalgic gait\par \par Right lower extremity\par Hip: Normal ROM without pain on IR/ER\par \par Knee\par Inspection: no effusion, no erythema.\par Wounds: None.\par Alignment: neutral.\par Palpation: Tender palpation posteriorly\par ROM active (in degrees): 0-140 pain with deep flexion\par Ligamentous laxity: stable to varus stress test, stable to valgus stress test\par Meniscal Test: Positive McMurrays, positive Yoko.\par Muscle Test: good quad strength. [de-identified] : 5/25/2023–right knee xrays, standing AP/Lateral and Merchant films, and 45 degree PA standing view taken today in the office are reviewed and demonstrate preserved joint space, no abnormalities

## 2023-06-28 ENCOUNTER — NON-APPOINTMENT (OUTPATIENT)
Age: 58
End: 2023-06-28

## 2023-08-22 ENCOUNTER — APPOINTMENT (OUTPATIENT)
Dept: ORTHOPEDIC SURGERY | Facility: CLINIC | Age: 58
End: 2023-08-22
Payer: COMMERCIAL

## 2023-08-22 VITALS — WEIGHT: 125 LBS | BODY MASS INDEX: 20.83 KG/M2 | HEIGHT: 65 IN

## 2023-08-22 DIAGNOSIS — Z78.9 OTHER SPECIFIED HEALTH STATUS: ICD-10-CM

## 2023-08-22 DIAGNOSIS — Z82.49 FAMILY HISTORY OF ISCHEMIC HEART DISEASE AND OTHER DISEASES OF THE CIRCULATORY SYSTEM: ICD-10-CM

## 2023-08-22 DIAGNOSIS — M67.88 OTHER SPECIFIED DISORDERS OF SYNOVIUM AND TENDON, OTHER SITE: ICD-10-CM

## 2023-08-22 DIAGNOSIS — Z86.79 PERSONAL HISTORY OF OTHER DISEASES OF THE CIRCULATORY SYSTEM: ICD-10-CM

## 2023-08-22 PROCEDURE — 99204 OFFICE O/P NEW MOD 45 MIN: CPT

## 2023-08-22 PROCEDURE — 73610 X-RAY EXAM OF ANKLE: CPT | Mod: LT

## 2023-08-22 NOTE — HISTORY OF PRESENT ILLNESS
[Constant] : constant [Standing] : standing [Stairs] : stairs [Full time] : Work status: full time [de-identified] : 8/22/23  Initial visit for this 58 year old female complaining of spontaneous onset of lt knee pain x last 4-5 months duration. NO hx of trauma. went to see another ortho MD x 3 months ago who gave her a cortisone injection with 6-8 weeks of temporary relief.Now continues to c/o persistent knee pain. has been able to,play tennis despite her complaints. Takes no nsaids.   Also has noticed a lump behind lt ankle x last few weeks only.. Very little complaints of pain. PMH: No prior lt knee issues and lt ankle issues.        s/p base of 5th MT fx lt foot x 15 years [] : no [FreeTextEntry1] : left knee and left ankle [FreeTextEntry9] : cortesone  injection [de-identified] : 5/25/23 [de-identified] : Dr Kala Cali [de-identified] : none [de-identified] :

## 2023-08-22 NOTE — REASON FOR VISIT
[FreeTextEntry2] : Left knee and left ankle pain past 5 months/ Dull and sharp left knee with pain level 9 and rest 8 / Left ankle dull pain past 4 weeks with pain level active 3 and resting 3.

## 2023-08-22 NOTE — PLAN
[TextEntry] : The patient was advised of the diagnosis. The natural history of the pathology was explained in full to the patient in layman's terms. All questions were answered. The risks and benefits of surgical and non-surgical treatment alternatives were explained in full to the patient. Heel cord stretching, ice, nsaids prn.  Obtain MRI lt knee. Pt may consider arthroscopic menisectomy.

## 2023-08-22 NOTE — DATA REVIEWED
[Outside X-rays] : outside x-rays [Left] : left [Knee] : knee [I reviewed the films/CD and agree] : I reviewed the films/CD and agree [FreeTextEntry1] : hypoplastic patella Normal joint spaces.

## 2023-08-29 ENCOUNTER — APPOINTMENT (OUTPATIENT)
Dept: MRI IMAGING | Facility: CLINIC | Age: 58
End: 2023-08-29
Payer: COMMERCIAL

## 2023-08-29 PROCEDURE — 73721 MRI JNT OF LWR EXTRE W/O DYE: CPT | Mod: LT

## 2023-09-05 ENCOUNTER — APPOINTMENT (OUTPATIENT)
Dept: ORTHOPEDIC SURGERY | Facility: CLINIC | Age: 58
End: 2023-09-05
Payer: COMMERCIAL

## 2023-09-05 ENCOUNTER — TRANSCRIPTION ENCOUNTER (OUTPATIENT)
Age: 58
End: 2023-09-05

## 2023-09-05 VITALS — WEIGHT: 125 LBS | BODY MASS INDEX: 20.83 KG/M2 | HEIGHT: 65 IN

## 2023-09-05 DIAGNOSIS — M23.92 UNSPECIFIED INTERNAL DERANGEMENT OF LEFT KNEE: ICD-10-CM

## 2023-09-05 DIAGNOSIS — S83.242A OTHER TEAR OF MEDIAL MENISCUS, CURRENT INJURY, LEFT KNEE, INITIAL ENCOUNTER: ICD-10-CM

## 2023-09-05 PROCEDURE — 99213 OFFICE O/P EST LOW 20 MIN: CPT

## 2023-09-05 NOTE — DISCUSSION/SUMMARY
[de-identified] : "Written by Carmen Walker, acting as Scribe for Javier Mckeon MD."  Dr. Mckeon -  The documentation recorded by the scribe accurately reflects the service I personally performed and the decisions made by me.

## 2023-09-05 NOTE — HISTORY OF PRESENT ILLNESS
[8] : 8 [6] : 6 [Dull/Aching] : dull/aching [Sharp] : sharp [Intermittent] : intermittent [Full time] : Work status: full time [de-identified] : 09/05/23:  Returns today for left knee MRI results.  IMPRESSION: 1.  Medial meniscal tear. 2.  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  Joint effusion. 3.  Hamstring and gastrocnemius tendinopathy with insertional tears and soft tissue edema.  3 cm popliteal cyst.   8/22/23  Initial visit for this 58 year old female complaining of spontaneous onset of lt knee pain x last 4-5 months duration. NO hx of trauma. went to see another ortho MD x 3 months ago who gave her a cortisone injection with 6-8 weeks of temporary relief.Now continues to c/o persistent knee pain. has been able to,play tennis despite her complaints. Takes no nsaids.   Also has noticed a lump behind lt ankle x last few weeks only.. Very little complaints of pain. PMH: No prior lt knee issues and lt ankle issues.        s/p base of 5th MT fx lt foot x 15 years [FreeTextEntry1] : left knee

## 2023-09-05 NOTE — PLAN
[TextEntry] : The patient was advised of the diagnosis. The natural history of the pathology was explained in full to the patient in layman's terms. All questions were answered. The risks and benefits of surgical and non-surgical treatment alternatives were explained in full to the patient.   MRI results reviewed.  Patient is being referred for physical therapy for various modalities.   Will refer to sports medicine MD for surgical consult.

## 2023-09-05 NOTE — PHYSICAL EXAM
[Normal Mood and Affect] : normal mood and affect [Able to Communicate] : able to communicate [Well Developed] : well developed [Well Nourished] : well nourished [5___] : eversion 5[unfilled]/5 [Left] : left ankle [There are no fractures, subluxations or dislocations. No significant abnormalities are seen] : There are no fractures, subluxations or dislocations. No significant abnormalities are seen [] : non-antalgic [FreeTextEntry3] : small lump over distal achilles. Slight tenderness to compression [FreeTextEntry9] : small spur off posterior tubercle

## 2023-10-02 ENCOUNTER — APPOINTMENT (OUTPATIENT)
Dept: ORTHOPEDIC SURGERY | Facility: CLINIC | Age: 58
End: 2023-10-02

## 2023-11-19 ENCOUNTER — APPOINTMENT (OUTPATIENT)
Dept: ORTHOPEDIC SURGERY | Facility: CLINIC | Age: 58
End: 2023-11-19
Payer: COMMERCIAL

## 2023-11-19 ENCOUNTER — RESULT CHARGE (OUTPATIENT)
Age: 58
End: 2023-11-19

## 2023-11-19 PROCEDURE — 99203 OFFICE O/P NEW LOW 30 MIN: CPT

## 2023-11-19 PROCEDURE — 73610 X-RAY EXAM OF ANKLE: CPT | Mod: RT

## 2023-12-01 ENCOUNTER — APPOINTMENT (OUTPATIENT)
Dept: ORTHOPEDIC SURGERY | Facility: CLINIC | Age: 58
End: 2023-12-01
Payer: COMMERCIAL

## 2023-12-01 VITALS
SYSTOLIC BLOOD PRESSURE: 113 MMHG | BODY MASS INDEX: 20.83 KG/M2 | HEART RATE: 62 BPM | WEIGHT: 125 LBS | DIASTOLIC BLOOD PRESSURE: 73 MMHG | HEIGHT: 65 IN

## 2023-12-01 PROCEDURE — 73610 X-RAY EXAM OF ANKLE: CPT | Mod: RT

## 2023-12-01 PROCEDURE — 99214 OFFICE O/P EST MOD 30 MIN: CPT

## 2023-12-07 ENCOUNTER — APPOINTMENT (OUTPATIENT)
Dept: ORTHOPEDIC SURGERY | Facility: CLINIC | Age: 58
End: 2023-12-07

## 2023-12-20 ENCOUNTER — APPOINTMENT (OUTPATIENT)
Dept: ORTHOPEDIC SURGERY | Facility: CLINIC | Age: 58
End: 2023-12-20
Payer: COMMERCIAL

## 2023-12-20 DIAGNOSIS — M25.571 PAIN IN RIGHT ANKLE AND JOINTS OF RIGHT FOOT: ICD-10-CM

## 2023-12-20 DIAGNOSIS — S82.61XA DISPLACED FRACTURE OF LATERAL MALLEOLUS OF RIGHT FIBULA, INITIAL ENCOUNTER FOR CLOSED FRACTURE: ICD-10-CM

## 2023-12-20 PROCEDURE — 73610 X-RAY EXAM OF ANKLE: CPT | Mod: RT

## 2023-12-20 PROCEDURE — 99213 OFFICE O/P EST LOW 20 MIN: CPT

## 2023-12-20 NOTE — ADDENDUM
[FreeTextEntry1] : I, CASSI RENETTA, acted solely as a scribe for Dr. Merrick Gabriel on this date 12/20/2023  .   All medical record entries made by the Scribe were at my, Dr. Merrick Gabriel, direction and personally dictated by me on 12/20/2023 . I have reviewed the chart and agree that the record accurately reflects my personal performance of the history, physical exam, assessment and plan. I have also personally directed, reviewed, and agreed with the chart.

## 2023-12-20 NOTE — PHYSICAL EXAM
[de-identified] : Right ankle Physical Examination:  General: Alert and oriented x3.  In no acute distress.  Pleasant in nature with a normal affect.  No apparent respiratory distress.  Erythema, Warmth, Rubor: Negative Swelling: Mild  ROM: 1. Dorsiflexion: 10 degrees 2. Plantarflexion: 40 degrees 3. Inversion: 30 degrees 4. Eversion: 20 degrees  Tenderness to Palpation:  1. Lateral Malleolus: Negative 2. Medial Malleolus: Negative 3. Proximal Fibular Pain: Negative 4. Heel Pain: Negative 5. Cuboid: Negative 6. Navicular: Negative 7. Tibiotalar Joint: Negative 8. Subtalar Joint: Negative 9. Posterior Recess: Negative  Tendon Pain: 1. Achilles: Negative 2. Peroneals: Negative 3. Posterior Tibialis: Negative 4. Tibialis Anterior: Negative  Ligament Pain: 1. ATFL: Negative 2. CFL: Negative  3. PTFL: Negative 4. Deltoid Ligaments: Negative 5. Lis Franc Ligament: Negative  Stability:  1. Anterior Drawer: Negative 2. Posterior Drawer: Negative  Strength: 5/5 TA/GS/EHL  Pulses: 2+ DP/PT Pulses  Neuro: Intact motor and sensory  Additional Test: 1. Calcaneal Squeeze Test: Negative 2. Syndesmosis Squeeze Test: Negative  Diffuse edema and ecchymosis lateral side and lateral tenderness over the lateral ligamentous complex  [de-identified] : 3V of the right ankle were ordered, obtained and reviewed by me today, 12/20/2023, revealed: avulsion fracture of lateral malleolus.

## 2023-12-20 NOTE — DISCUSSION/SUMMARY
[de-identified] : Today I had a lengthy discussion with the patient regarding their right ankle pain. I have addressed all the patient's concerns surrounding the pathology of their condition.  I have reviewed the patient's XR imaging with them in great detail.  I recommend the patient continue to undergo a course of physical therapy for the right ankle. She will continue with the ASO brace and transition out of the brace with her physical therapist. I recommend that the patient utilize ice, NSAIDS PRN, and heat. They can also elevate their RLE above the level of the heart. I reiterated that healing may take weeks to months. Patient understood. Activities as tolerated.   The patient understood and verbally agreed to the treatment plan. All of their questions were answered and they were satisfied with the visit. The patient should call the office if they have any questions or experience worsening symptoms.   Follow-up in 4-6 weeks if needed.

## 2023-12-20 NOTE — HISTORY OF PRESENT ILLNESS
[FreeTextEntry1] : 12/20/2023: ROBIN LEWISLOMBARDI is a 58 year old female presenting to the office for a follow up evaluation of her right ankle. She reports that she is doing well with some improvement to her symptoms. She began physical therapy 10 days ago and only complains of some sensitivity of her right ankle, over the avulsion site. She presents to the office in sneakers with an ASO brace and ambulating without assistance.  12/1/2023: The patient is a 58-year-old female who presents for right ankle pain. The patient twisted her ankle the Sunday before thanksgiving when she was walking and her foot became caught in a tree root. She presents to the office in a CAM boot and is ambulating without assistance.

## 2024-05-14 ENCOUNTER — INPATIENT (INPATIENT)
Facility: HOSPITAL | Age: 59
LOS: 2 days | Discharge: HOME | DRG: 391 | End: 2024-05-17
Attending: SURGERY | Admitting: INTERNAL MEDICINE
Payer: COMMERCIAL

## 2024-05-14 VITALS
TEMPERATURE: 99 F | DIASTOLIC BLOOD PRESSURE: 71 MMHG | HEART RATE: 75 BPM | OXYGEN SATURATION: 97 % | RESPIRATION RATE: 16 BRPM | SYSTOLIC BLOOD PRESSURE: 132 MMHG

## 2024-05-14 DIAGNOSIS — K57.20 DIVERTICULITIS OF LARGE INTESTINE WITH PERFORATION AND ABSCESS WITHOUT BLEEDING: ICD-10-CM

## 2024-05-14 LAB
ALBUMIN SERPL ELPH-MCNC: 4.1 G/DL — SIGNIFICANT CHANGE UP (ref 3.3–5)
ALP SERPL-CCNC: 100 U/L — SIGNIFICANT CHANGE UP (ref 40–120)
ALT FLD-CCNC: 35 U/L — SIGNIFICANT CHANGE UP (ref 12–78)
ANION GAP SERPL CALC-SCNC: 6 MMOL/L — SIGNIFICANT CHANGE UP (ref 5–17)
APPEARANCE UR: CLEAR — SIGNIFICANT CHANGE UP
APTT BLD: 34.1 SEC — SIGNIFICANT CHANGE UP (ref 24.5–35.6)
AST SERPL-CCNC: 31 U/L — SIGNIFICANT CHANGE UP (ref 15–37)
BACTERIA # UR AUTO: NEGATIVE /HPF — SIGNIFICANT CHANGE UP
BASOPHILS # BLD AUTO: 0.01 K/UL — SIGNIFICANT CHANGE UP (ref 0–0.2)
BASOPHILS NFR BLD AUTO: 0.1 % — SIGNIFICANT CHANGE UP (ref 0–2)
BILIRUB SERPL-MCNC: 1 MG/DL — SIGNIFICANT CHANGE UP (ref 0.2–1.2)
BILIRUB UR-MCNC: NEGATIVE — SIGNIFICANT CHANGE UP
BLD GP AB SCN SERPL QL: SIGNIFICANT CHANGE UP
BUN SERPL-MCNC: 10 MG/DL — SIGNIFICANT CHANGE UP (ref 7–23)
CALCIUM SERPL-MCNC: 9.8 MG/DL — SIGNIFICANT CHANGE UP (ref 8.5–10.1)
CAST: 0 /LPF — SIGNIFICANT CHANGE UP (ref 0–4)
CHLORIDE SERPL-SCNC: 104 MMOL/L — SIGNIFICANT CHANGE UP (ref 96–108)
CO2 SERPL-SCNC: 26 MMOL/L — SIGNIFICANT CHANGE UP (ref 22–31)
COLOR SPEC: YELLOW — SIGNIFICANT CHANGE UP
CREAT SERPL-MCNC: 0.79 MG/DL — SIGNIFICANT CHANGE UP (ref 0.5–1.3)
DIFF PNL FLD: ABNORMAL
EGFR: 87 ML/MIN/1.73M2 — SIGNIFICANT CHANGE UP
EOSINOPHIL # BLD AUTO: 0.01 K/UL — SIGNIFICANT CHANGE UP (ref 0–0.5)
EOSINOPHIL NFR BLD AUTO: 0.1 % — SIGNIFICANT CHANGE UP (ref 0–6)
GLUCOSE SERPL-MCNC: 104 MG/DL — HIGH (ref 70–99)
GLUCOSE UR QL: NEGATIVE MG/DL — SIGNIFICANT CHANGE UP
HCT VFR BLD CALC: 37.4 % — SIGNIFICANT CHANGE UP (ref 34.5–45)
HGB BLD-MCNC: 12.3 G/DL — SIGNIFICANT CHANGE UP (ref 11.5–15.5)
IMM GRANULOCYTES NFR BLD AUTO: 0.3 % — SIGNIFICANT CHANGE UP (ref 0–0.9)
INR BLD: 1.06 RATIO — SIGNIFICANT CHANGE UP (ref 0.85–1.18)
KETONES UR-MCNC: 15 MG/DL
LACTATE SERPL-SCNC: 0.8 MMOL/L — SIGNIFICANT CHANGE UP (ref 0.7–2)
LEUKOCYTE ESTERASE UR-ACNC: ABNORMAL
LIDOCAIN IGE QN: 25 U/L — SIGNIFICANT CHANGE UP (ref 13–75)
LYMPHOCYTES # BLD AUTO: 1 K/UL — SIGNIFICANT CHANGE UP (ref 1–3.3)
LYMPHOCYTES # BLD AUTO: 8.3 % — LOW (ref 13–44)
MAGNESIUM SERPL-MCNC: 2 MG/DL — SIGNIFICANT CHANGE UP (ref 1.6–2.6)
MCHC RBC-ENTMCNC: 28.8 PG — SIGNIFICANT CHANGE UP (ref 27–34)
MCHC RBC-ENTMCNC: 32.9 GM/DL — SIGNIFICANT CHANGE UP (ref 32–36)
MCV RBC AUTO: 87.6 FL — SIGNIFICANT CHANGE UP (ref 80–100)
MONOCYTES # BLD AUTO: 0.66 K/UL — SIGNIFICANT CHANGE UP (ref 0–0.9)
MONOCYTES NFR BLD AUTO: 5.5 % — SIGNIFICANT CHANGE UP (ref 2–14)
NEUTROPHILS # BLD AUTO: 10.39 K/UL — HIGH (ref 1.8–7.4)
NEUTROPHILS NFR BLD AUTO: 85.7 % — HIGH (ref 43–77)
NITRITE UR-MCNC: NEGATIVE — SIGNIFICANT CHANGE UP
PH UR: 6 — SIGNIFICANT CHANGE UP (ref 5–8)
PLATELET # BLD AUTO: 172 K/UL — SIGNIFICANT CHANGE UP (ref 150–400)
POTASSIUM SERPL-MCNC: 3.7 MMOL/L — SIGNIFICANT CHANGE UP (ref 3.5–5.3)
POTASSIUM SERPL-SCNC: 3.7 MMOL/L — SIGNIFICANT CHANGE UP (ref 3.5–5.3)
PROT SERPL-MCNC: 7.8 GM/DL — SIGNIFICANT CHANGE UP (ref 6–8.3)
PROT UR-MCNC: NEGATIVE MG/DL — SIGNIFICANT CHANGE UP
PROTHROM AB SERPL-ACNC: 12 SEC — SIGNIFICANT CHANGE UP (ref 9.5–13)
RBC # BLD: 4.27 M/UL — SIGNIFICANT CHANGE UP (ref 3.8–5.2)
RBC # FLD: 12 % — SIGNIFICANT CHANGE UP (ref 10.3–14.5)
RBC CASTS # UR COMP ASSIST: 2 /HPF — SIGNIFICANT CHANGE UP (ref 0–4)
SODIUM SERPL-SCNC: 136 MMOL/L — SIGNIFICANT CHANGE UP (ref 135–145)
SP GR SPEC: >1.03 — HIGH (ref 1–1.03)
SQUAMOUS # UR AUTO: 3 /HPF — SIGNIFICANT CHANGE UP (ref 0–5)
UROBILINOGEN FLD QL: 0.2 MG/DL — SIGNIFICANT CHANGE UP (ref 0.2–1)
WBC # BLD: 12.11 K/UL — HIGH (ref 3.8–10.5)
WBC # FLD AUTO: 12.11 K/UL — HIGH (ref 3.8–10.5)
WBC UR QL: 7 /HPF — HIGH (ref 0–5)

## 2024-05-14 PROCEDURE — 83735 ASSAY OF MAGNESIUM: CPT

## 2024-05-14 PROCEDURE — 36415 COLL VENOUS BLD VENIPUNCTURE: CPT

## 2024-05-14 PROCEDURE — 85027 COMPLETE CBC AUTOMATED: CPT

## 2024-05-14 PROCEDURE — 84100 ASSAY OF PHOSPHORUS: CPT

## 2024-05-14 PROCEDURE — 85025 COMPLETE CBC W/AUTO DIFF WBC: CPT

## 2024-05-14 PROCEDURE — 71045 X-RAY EXAM CHEST 1 VIEW: CPT | Mod: 26

## 2024-05-14 PROCEDURE — 99285 EMERGENCY DEPT VISIT HI MDM: CPT

## 2024-05-14 PROCEDURE — 80053 COMPREHEN METABOLIC PANEL: CPT

## 2024-05-14 RX ORDER — SODIUM CHLORIDE 9 MG/ML
1700 INJECTION INTRAMUSCULAR; INTRAVENOUS; SUBCUTANEOUS ONCE
Refills: 0 | Status: COMPLETED | OUTPATIENT
Start: 2024-05-14 | End: 2024-05-14

## 2024-05-14 RX ORDER — PIPERACILLIN AND TAZOBACTAM 4; .5 G/20ML; G/20ML
3.38 INJECTION, POWDER, LYOPHILIZED, FOR SOLUTION INTRAVENOUS EVERY 8 HOURS
Refills: 0 | Status: DISCONTINUED | OUTPATIENT
Start: 2024-05-14 | End: 2024-05-17

## 2024-05-14 RX ORDER — ASCORBIC ACID 60 MG
1 TABLET,CHEWABLE ORAL
Refills: 0 | DISCHARGE

## 2024-05-14 RX ORDER — MILK THISTLE 150 MG
1 CAPSULE ORAL
Refills: 0 | DISCHARGE

## 2024-05-14 RX ORDER — ACETAMINOPHEN 500 MG
1000 TABLET ORAL ONCE
Refills: 0 | Status: COMPLETED | OUTPATIENT
Start: 2024-05-14 | End: 2024-05-14

## 2024-05-14 RX ORDER — KETOROLAC TROMETHAMINE 30 MG/ML
15 SYRINGE (ML) INJECTION EVERY 6 HOURS
Refills: 0 | Status: DISCONTINUED | OUTPATIENT
Start: 2024-05-14 | End: 2024-05-17

## 2024-05-14 RX ORDER — PIPERACILLIN AND TAZOBACTAM 4; .5 G/20ML; G/20ML
3.38 INJECTION, POWDER, LYOPHILIZED, FOR SOLUTION INTRAVENOUS ONCE
Refills: 0 | Status: COMPLETED | OUTPATIENT
Start: 2024-05-14 | End: 2024-05-14

## 2024-05-14 RX ORDER — LACTOBACILLUS ACIDOPHILUS 100MM CELL
1 CAPSULE ORAL
Refills: 0 | DISCHARGE

## 2024-05-14 RX ORDER — SODIUM CHLORIDE 9 MG/ML
1000 INJECTION INTRAMUSCULAR; INTRAVENOUS; SUBCUTANEOUS
Refills: 0 | Status: DISCONTINUED | OUTPATIENT
Start: 2024-05-14 | End: 2024-05-17

## 2024-05-14 RX ORDER — MORPHINE SULFATE 50 MG/1
2 CAPSULE, EXTENDED RELEASE ORAL EVERY 4 HOURS
Refills: 0 | Status: DISCONTINUED | OUTPATIENT
Start: 2024-05-14 | End: 2024-05-17

## 2024-05-14 RX ORDER — ONDANSETRON 8 MG/1
4 TABLET, FILM COATED ORAL EVERY 6 HOURS
Refills: 0 | Status: DISCONTINUED | OUTPATIENT
Start: 2024-05-14 | End: 2024-05-17

## 2024-05-14 RX ORDER — ENOXAPARIN SODIUM 100 MG/ML
40 INJECTION SUBCUTANEOUS EVERY 24 HOURS
Refills: 0 | Status: DISCONTINUED | OUTPATIENT
Start: 2024-05-14 | End: 2024-05-17

## 2024-05-14 RX ORDER — CHOLECALCIFEROL (VITAMIN D3) 125 MCG
2 CAPSULE ORAL
Refills: 0 | DISCHARGE

## 2024-05-14 RX ORDER — NORETHINDRONE AND ETHINYL ESTRADIOL 0.4-0.035
1 KIT ORAL
Qty: 0 | Refills: 0 | DISCHARGE

## 2024-05-14 RX ORDER — ACETAMINOPHEN 500 MG
1000 TABLET ORAL ONCE
Refills: 0 | Status: DISCONTINUED | OUTPATIENT
Start: 2024-05-14 | End: 2024-05-17

## 2024-05-14 RX ORDER — OMEGA-3 ACID ETHYL ESTERS 1 G
1 CAPSULE ORAL
Refills: 0 | DISCHARGE

## 2024-05-14 RX ADMIN — Medication 400 MILLIGRAM(S): at 21:34

## 2024-05-14 RX ADMIN — SODIUM CHLORIDE 1700 MILLILITER(S): 9 INJECTION INTRAMUSCULAR; INTRAVENOUS; SUBCUTANEOUS at 23:57

## 2024-05-14 RX ADMIN — Medication 1000 MILLIGRAM(S): at 21:54

## 2024-05-14 RX ADMIN — PIPERACILLIN AND TAZOBACTAM 3.38 GRAM(S): 4; .5 INJECTION, POWDER, LYOPHILIZED, FOR SOLUTION INTRAVENOUS at 22:45

## 2024-05-14 RX ADMIN — SODIUM CHLORIDE 1700 MILLILITER(S): 9 INJECTION INTRAMUSCULAR; INTRAVENOUS; SUBCUTANEOUS at 21:28

## 2024-05-14 RX ADMIN — PIPERACILLIN AND TAZOBACTAM 200 GRAM(S): 4; .5 INJECTION, POWDER, LYOPHILIZED, FOR SOLUTION INTRAVENOUS at 22:14

## 2024-05-14 NOTE — ED PROVIDER NOTE - CLINICAL SUMMARY MEDICAL DECISION MAKING FREE TEXT BOX
signed Kassie Ng PA-C   Admit to Dr boles for complicated diverticulitis as per cody Brandt. Pt agrees with admission and plan of care. 59-year-old WF, PMH includes diverticulosis, BIB private car from home to ED per GI referral Dr. Roche follows status post abnormal outpatient CT A/P + acute diverticulitis with associated multiple abscesses.  Patient referred to ED for admission IV antibiotics and surgical consultation.  LLQ abd pain onset last night progressively worsening.  Exam: + LLQ abd tender, VSS.  Pt nontoxic  Plan: labs incl. pre-op, pan-culture, lipase, lactate, CXR, EKG, IVF, IV Abx.  [place] consult.  [place].    signed Kassie Ng PA-C   Admit to Dr boles for complicated diverticulitis as per sx marjan Brandt. Pt agrees with admission and plan of care. 59-year-old WF, PMH includes diverticulosis, BIB private car from home to ED per GI referral Dr. Roche follows status post abnormal outpatient CT A/P + acute diverticulitis with associated multiple abscesses.  Patient referred to ED for admission IV antibiotics and surgical consultation.  LLQ abd pain onset last night progressively worsening.  Exam: + LLQ abd tender, VSS.  Pt nontoxic  Plan: labs incl. pre-op, pan-culture, lipase, lactate, CXR, EKG, IVF, IV Abx, IV Tylenol.  [place] consult.  Admit.    signed Kassie Ng PA-C   Admit to Dr boles for complicated diverticulitis as per sx marjan Brandt. Pt agrees with admission and plan of care.

## 2024-05-14 NOTE — CONSULT NOTE ADULT - SUBJECTIVE AND OBJECTIVE BOX
GI consult    HPI:  58 yo F with PMHx  MVP, diverticulosis present to the ED with  suprapubic and LLQ pain of 1 day duration. Pain is constant, non radiating, associated with subjective fever and chills. She denies nausea and vomiting. Having BM, no abd distension.  She called my office this afternoon with above complaints and had CT this evening showing simgoid diverticulitis with subcm abscesses, microperforation, and phlegmon. Sent to ER for IV abx and pain mgmt. I reviewed images from [facility] in PACS-no free air.  She had routine screening colonoscopy 2/2024 demonstrating sigmoid diverticulosis.      PAST MEDICAL & SURGICAL HISTORY:  Bronchitis      Polyp of colon  removed via colonoscopy      Diverticulosis      No significant past surgical history          Home Medications:  Junel Fe 1/20 oral tablet: 1 tab(s) orally once a day (30 May 2022 14:02)      MEDICATIONS  (STANDING):  enoxaparin Injectable 40 milliGRAM(s) SubCutaneous every 24 hours  piperacillin/tazobactam IVPB.. 3.375 Gram(s) IV Intermittent every 8 hours  sodium chloride 0.9%. 1000 milliLiter(s) (100 mL/Hr) IV Continuous <Continuous>    MEDICATIONS  (PRN):  acetaminophen   IVPB .. 1000 milliGRAM(s) IV Intermittent once PRN Temp greater or equal to 38C (100.4F), Mild Pain (1 - 3)  ketorolac   Injectable 15 milliGRAM(s) IV Push every 6 hours PRN Moderate Pain (4 - 6)  morphine  - Injectable 2 milliGRAM(s) IV Push every 4 hours PRN Severe Pain (7 - 10)  ondansetron Injectable 4 milliGRAM(s) IV Push every 6 hours PRN Nausea and/or Vomiting      Allergies    No Known Allergies    Intolerances    Percocet 5/325 (Stomach Upset)      SOCIAL HISTORY: NC    FAMILY HISTORY: NC      ROS  As above  Otherwise unremarkable, all systems reviewed    PE:  Vital Signs Last 24 Hrs  T(C): 37.3 (14 May 2024 21:14), Max: 37.3 (14 May 2024 21:14)  T(F): 99.1 (14 May 2024 21:14), Max: 99.1 (14 May 2024 21:14)  HR: 75 (14 May 2024 21:14) (75 - 75)  BP: 132/71 (14 May 2024 21:14) (132/71 - 132/71)  BP(mean): --  RR: 16 (14 May 2024 21:14) (16 - 16)  SpO2: 97% (14 May 2024 21:14) (97% - 97%)    Parameters below as of 14 May 2024 21:14  Patient On (Oxygen Delivery Method): room air        Constitutional: NAD, well-developed, A+Ox3  Anicteric   Respiratory: CTABL, breathing comfortably  Cardiovascular: S1 and S2, RRR  Gastrointestinal: +BS, soft, +suprapubic and LLQ tenderness, non distended, no mass  Extremities: warm, well perfused, no edema  Psychiatric: Normal mood, normal affect  Neuro: moves all extremities, grossly intact  Skin: No rashes or lesions    LABS:                        12.3   12.11 )-----------( 172      ( 14 May 2024 21:15 )             37.4     05-14    136  |  104  |  10  ----------------------------<  104<H>  3.7   |  26  |  0.79    Ca    9.8      14 May 2024 21:15  Mg     2.0     05-14    TPro  7.8  /  Alb  4.1  /  TBili  1.0  /  DBili  x   /  AST  31  /  ALT  35  /  AlkPhos  100  05-14    PT/INR - ( 14 May 2024 21:15 )   PT: 12.0 sec;   INR: 1.06 ratio         PTT - ( 14 May 2024 21:15 )  PTT:34.1 sec  LIVER FUNCTIONS - ( 14 May 2024 21:15 )  Alb: 4.1 g/dL / Pro: 7.8 gm/dL / ALK PHOS: 100 U/L / ALT: 35 U/L / AST: 31 U/L / GGT: x             RADIOLOGY & ADDITIONAL STUDIES:  [facility] CT scan reviewed done today-see HPI

## 2024-05-14 NOTE — ED PROVIDER NOTE - OBJECTIVE STATEMENT
59F sent by AMIRA Fisher for outpt CT this evening showing acute sigmoid diverticulitis with multiple subcentimeter abscess and microperforation. Pt had abd pain last night and today, left sided. +chills. No N/V/D. +BM today, non bloody. Pt had colonoscopy earlier this year with Dr Fisher +diverticulosis but has never had an issue before this. PMD Marck 59F sent by AMIRA Fisher for outpt CT this evening showing acute sigmoid diverticulitis with multiple subcentimeter abscess and microperforation. Pt had abd pain last night and today, left sided. +chills. No N/V/D. +BM today, non bloody. Pt had colonoscopy earlier this year with Dr Fisher +diverticulosis but has never had an issue before this. PMD Marck Persaud MD, ED Attdg:  case d/w ED PA.  Pt seen, evaluated in ED.  59-year-old WF, PMH includes diverticulosis, BIB private car from home to ED per GI referral Dr. Roche follows status post abnormal outpatient CT A/P + acute diverticulitis with associated multiple abscesses.  Patient referred to ED for admission IV antibiotics and surgical consultation.  Patient reports LLQ abd pain onset last night progressively worsening interfering with sleep, aggravated by some movement.  Associated chills but no N/V/D, decreased appetite, no fever, no urine complaints.  pain of moderate severity, constant, nonradiating.

## 2024-05-14 NOTE — ED PROVIDER NOTE - ATTENDING APP SHARED VISIT CONTRIBUTION OF CARE
WALE Persaud MD, ED Attending physician:  This was a shared visit with ALTA.  I reviewed and verified the documentation and independently performed the documented history/exam/mdm.

## 2024-05-14 NOTE — H&P ADULT - NSHPLABSRESULTS_GEN_ALL_CORE
12.3   12.11 )-----------( 172      ( 14 May 2024 21:15 )             37.4          CT scan done outside facility report thickened sigmoid colon with diverticula, multiple subcentimeter abscess and microperforation

## 2024-05-14 NOTE — H&P ADULT - HISTORY OF PRESENT ILLNESS
60 yo F with PMHx  MVP, diverticulosis present to the ED with  suprapubic and LLQ pain of 1 day duration. Pain is constant, non radiating, associated with subjective fever and chills. She denies nausea and vomiting. Having BM, no abd distension

## 2024-05-14 NOTE — ED PROVIDER NOTE - PRIOR OUTPATIENT RADIOLOGY SUMMARY
5/14 CT A/P report from [facility]: + acuter diverticulitis with mult. associated abscesses, no obvious perforation.

## 2024-05-14 NOTE — ED PROVIDER NOTE - PHYSICAL EXAMINATION
DOMINIC Persaud MD:   Gen'l: WD/WN adult in NAD, no respiratory discomfort, no sentence shortening, not acutely ill.  Head: NC/AT  Eyes: PERRRL, EOMI  ENT: O/P clear, mmm  CV: RRR, normal radial pulse  Lungs: CTA, normal respirations  GI: soft, + focal LLQ abd tender, + G/R, no obvious mass.  : Deferred, no flank nor CVAT  Neck: NT, supple w/o pain  MSK: LI x 4, no focal extremity swelling nor tenderness, B/L SLR 35 degrees w/o pain, normal motor  Skin: no tactile warmth, no rash  Neuro: A+O x 4, CN 2 -12 intact, normal speech, no focal motor/sensory deficits

## 2024-05-14 NOTE — ED ADULT TRIAGE NOTE - CHIEF COMPLAINT QUOTE
Pt presents to ED, stating that pt had a CT done earlier, which showed acute diverticulitis, multiple abscesses and micro perforation. Pt visibly in pain. Pt sent in by MD Fisher to receive iv abx. Pt endorsing abdominal pain.

## 2024-05-14 NOTE — ED ADULT NURSE NOTE - TEMPLATE
PICC placed to right arm, Farhana Mazariegos RN clinco collected t&c and sent to lab. Lab notified. General

## 2024-05-14 NOTE — H&P ADULT - ASSESSMENT
58 yo with complicated diverticulitis     CT scan done outside facility report thickened sigmoid colon with diverticula, multiple subcentimeter abscess and microperforation       Plan   Admit for CRS   Keep NPO  IVF   Pain control and antiemetic prn   DVT ppx   OOB ambulation   Serial abd examination       Discussed with Attending  58 yo with complicated diverticulitis     CT scan done outside facility report thickened sigmoid colon with diverticula, multiple subcentimeter abscess and microperforation       Plan   Admit for CRS   Keep NPO  IV abx  IVF   Pain control and antiemetic prn   DVT ppx   OOB ambulation   Serial abd examination       Discussed with Attending

## 2024-05-14 NOTE — PHARMACOTHERAPY INTERVENTION NOTE - COMMENTS
Medication reconciliation completed.  Reviewed Medication list and confirmed med allergies with patient; confirmed with Dr. First Medleon. 
Medication reconciliation completed.  Reviewed Medication list and confirmed med allergies with patient; confirmed with Dr. First Medleon.

## 2024-05-14 NOTE — H&P ADULT - NSHPPHYSICALEXAM_GEN_ALL_CORE
PHYSICAL EXAM:  - GENERAL: Alert and oriented x 3. No acute distress.  - EYES: EOMI. Anicteric.  - HENT: Moist mucous membranes. No scleral icterus. No cervical lymphadenopathy.  - LUNGS: Clear to auscultation bilaterally. No accessory muscle use.  - CARDIOVASCULAR: Regular rate and rhythm. No murmur. No JVD.  - ABDOMEN: Soft, suprapupic/LLQ TTP, no guarding and non-distended. No palpable masses.  - EXTREMITIES: No edema. Non-tender.  - SKIN: No rashes or lesions. Warm.  - NEUROLOGIC: No focal neurological deficits. CN II-XII grossly intact, but not individually tested.  - PSYCHIATRIC: Cooperative. Appropriate mood and affect.

## 2024-05-15 LAB
ALBUMIN SERPL ELPH-MCNC: 3.2 G/DL — LOW (ref 3.3–5)
ALP SERPL-CCNC: 79 U/L — SIGNIFICANT CHANGE UP (ref 40–120)
ALT FLD-CCNC: 24 U/L — SIGNIFICANT CHANGE UP (ref 12–78)
ANION GAP SERPL CALC-SCNC: 7 MMOL/L — SIGNIFICANT CHANGE UP (ref 5–17)
AST SERPL-CCNC: 21 U/L — SIGNIFICANT CHANGE UP (ref 15–37)
BILIRUB SERPL-MCNC: 1.1 MG/DL — SIGNIFICANT CHANGE UP (ref 0.2–1.2)
BUN SERPL-MCNC: 7 MG/DL — SIGNIFICANT CHANGE UP (ref 7–23)
CALCIUM SERPL-MCNC: 8.9 MG/DL — SIGNIFICANT CHANGE UP (ref 8.5–10.1)
CHLORIDE SERPL-SCNC: 110 MMOL/L — HIGH (ref 96–108)
CO2 SERPL-SCNC: 22 MMOL/L — SIGNIFICANT CHANGE UP (ref 22–31)
CREAT SERPL-MCNC: 0.59 MG/DL — SIGNIFICANT CHANGE UP (ref 0.5–1.3)
EGFR: 105 ML/MIN/1.73M2 — SIGNIFICANT CHANGE UP
GLUCOSE SERPL-MCNC: 89 MG/DL — SIGNIFICANT CHANGE UP (ref 70–99)
HCT VFR BLD CALC: 31.9 % — LOW (ref 34.5–45)
HGB BLD-MCNC: 10.4 G/DL — LOW (ref 11.5–15.5)
MAGNESIUM SERPL-MCNC: 2 MG/DL — SIGNIFICANT CHANGE UP (ref 1.6–2.6)
MCHC RBC-ENTMCNC: 28.7 PG — SIGNIFICANT CHANGE UP (ref 27–34)
MCHC RBC-ENTMCNC: 32.6 GM/DL — SIGNIFICANT CHANGE UP (ref 32–36)
MCV RBC AUTO: 88.1 FL — SIGNIFICANT CHANGE UP (ref 80–100)
PHOSPHATE SERPL-MCNC: 3.1 MG/DL — SIGNIFICANT CHANGE UP (ref 2.5–4.5)
PLATELET # BLD AUTO: 132 K/UL — LOW (ref 150–400)
POTASSIUM SERPL-MCNC: 3.4 MMOL/L — LOW (ref 3.5–5.3)
POTASSIUM SERPL-SCNC: 3.4 MMOL/L — LOW (ref 3.5–5.3)
PROT SERPL-MCNC: 6.5 GM/DL — SIGNIFICANT CHANGE UP (ref 6–8.3)
RBC # BLD: 3.62 M/UL — LOW (ref 3.8–5.2)
RBC # FLD: 12.1 % — SIGNIFICANT CHANGE UP (ref 10.3–14.5)
SODIUM SERPL-SCNC: 139 MMOL/L — SIGNIFICANT CHANGE UP (ref 135–145)
WBC # BLD: 8.8 K/UL — SIGNIFICANT CHANGE UP (ref 3.8–10.5)
WBC # FLD AUTO: 8.8 K/UL — SIGNIFICANT CHANGE UP (ref 3.8–10.5)

## 2024-05-15 PROCEDURE — 99232 SBSQ HOSP IP/OBS MODERATE 35: CPT

## 2024-05-15 RX ORDER — ACETAMINOPHEN 500 MG
2 TABLET ORAL
Qty: 12 | Refills: 0
Start: 2024-05-15

## 2024-05-15 RX ORDER — ACETAMINOPHEN 500 MG
650 TABLET ORAL EVERY 6 HOURS
Refills: 0 | Status: DISCONTINUED | OUTPATIENT
Start: 2024-05-15 | End: 2024-05-17

## 2024-05-15 RX ADMIN — SODIUM CHLORIDE 100 MILLILITER(S): 9 INJECTION INTRAMUSCULAR; INTRAVENOUS; SUBCUTANEOUS at 06:48

## 2024-05-15 RX ADMIN — PIPERACILLIN AND TAZOBACTAM 25 GRAM(S): 4; .5 INJECTION, POWDER, LYOPHILIZED, FOR SOLUTION INTRAVENOUS at 20:28

## 2024-05-15 RX ADMIN — PIPERACILLIN AND TAZOBACTAM 25 GRAM(S): 4; .5 INJECTION, POWDER, LYOPHILIZED, FOR SOLUTION INTRAVENOUS at 06:48

## 2024-05-15 RX ADMIN — PIPERACILLIN AND TAZOBACTAM 25 GRAM(S): 4; .5 INJECTION, POWDER, LYOPHILIZED, FOR SOLUTION INTRAVENOUS at 14:22

## 2024-05-15 RX ADMIN — Medication 650 MILLIGRAM(S): at 06:47

## 2024-05-15 RX ADMIN — SODIUM CHLORIDE 100 MILLILITER(S): 9 INJECTION INTRAMUSCULAR; INTRAVENOUS; SUBCUTANEOUS at 20:28

## 2024-05-15 NOTE — PROGRESS NOTE ADULT - ASSESSMENT
58 yo with acute complicated diverticulitis     CT scan done outside facility report thickened sigmoid colon with diverticula, multiple subcentimeter abscess and microperforation       Plan   patient family is to bring in CD of CT scan  Keep NPO  IVF   Pain control and antiemetic prn   DVT ppx   OOB ambulation   Serial abd examination       Discussed with Attending  60 yo with acute complicated diverticulitis     CT scan done outside facility report thickened sigmoid colon with diverticula, multiple subcentimeter abscess and microperforation       Plan   patient family is to bring in CD of CT scan  Keep NPO  IVF   Cont abx   Pain control and antiemetic prn   DVT ppx   OOB ambulation   Serial abd examination       Discussed with Attending

## 2024-05-15 NOTE — PATIENT PROFILE ADULT - FALL HARM RISK - HARM RISK INTERVENTIONS

## 2024-05-15 NOTE — PROGRESS NOTE ADULT - ASSESSMENT
Acute complicated sigmoid diverticulitis, with microperforation and sub-cm abscesses.     Rec:  -NPO per surgery, can likely advance to clears soon  -IV zosyn  -pain meds prn-pt just wants tylenol  -does not need surgery or IR drain at the moment  -does not need repeat CT, can re-eval daily if worsens for need for repeat imaging  -d/w pt in detail

## 2024-05-16 LAB
ALBUMIN SERPL ELPH-MCNC: 3 G/DL — LOW (ref 3.3–5)
ALP SERPL-CCNC: 74 U/L — SIGNIFICANT CHANGE UP (ref 40–120)
ALT FLD-CCNC: 22 U/L — SIGNIFICANT CHANGE UP (ref 12–78)
ANION GAP SERPL CALC-SCNC: 11 MMOL/L — SIGNIFICANT CHANGE UP (ref 5–17)
AST SERPL-CCNC: 20 U/L — SIGNIFICANT CHANGE UP (ref 15–37)
BASOPHILS # BLD AUTO: 0.01 K/UL — SIGNIFICANT CHANGE UP (ref 0–0.2)
BASOPHILS NFR BLD AUTO: 0.2 % — SIGNIFICANT CHANGE UP (ref 0–2)
BILIRUB SERPL-MCNC: 0.7 MG/DL — SIGNIFICANT CHANGE UP (ref 0.2–1.2)
BUN SERPL-MCNC: 8 MG/DL — SIGNIFICANT CHANGE UP (ref 7–23)
CALCIUM SERPL-MCNC: 8.6 MG/DL — SIGNIFICANT CHANGE UP (ref 8.5–10.1)
CHLORIDE SERPL-SCNC: 113 MMOL/L — HIGH (ref 96–108)
CO2 SERPL-SCNC: 17 MMOL/L — LOW (ref 22–31)
CREAT SERPL-MCNC: 0.57 MG/DL — SIGNIFICANT CHANGE UP (ref 0.5–1.3)
CULTURE RESULTS: SIGNIFICANT CHANGE UP
EGFR: 106 ML/MIN/1.73M2 — SIGNIFICANT CHANGE UP
EOSINOPHIL # BLD AUTO: 0.03 K/UL — SIGNIFICANT CHANGE UP (ref 0–0.5)
EOSINOPHIL NFR BLD AUTO: 0.6 % — SIGNIFICANT CHANGE UP (ref 0–6)
GLUCOSE SERPL-MCNC: 56 MG/DL — LOW (ref 70–99)
HCT VFR BLD CALC: 31.8 % — LOW (ref 34.5–45)
HGB BLD-MCNC: 10.4 G/DL — LOW (ref 11.5–15.5)
IMM GRANULOCYTES NFR BLD AUTO: 0.4 % — SIGNIFICANT CHANGE UP (ref 0–0.9)
LYMPHOCYTES # BLD AUTO: 0.84 K/UL — LOW (ref 1–3.3)
LYMPHOCYTES # BLD AUTO: 17.4 % — SIGNIFICANT CHANGE UP (ref 13–44)
MAGNESIUM SERPL-MCNC: 1.9 MG/DL — SIGNIFICANT CHANGE UP (ref 1.6–2.6)
MCHC RBC-ENTMCNC: 29.5 PG — SIGNIFICANT CHANGE UP (ref 27–34)
MCHC RBC-ENTMCNC: 32.7 GM/DL — SIGNIFICANT CHANGE UP (ref 32–36)
MCV RBC AUTO: 90.1 FL — SIGNIFICANT CHANGE UP (ref 80–100)
MONOCYTES # BLD AUTO: 0.28 K/UL — SIGNIFICANT CHANGE UP (ref 0–0.9)
MONOCYTES NFR BLD AUTO: 5.8 % — SIGNIFICANT CHANGE UP (ref 2–14)
NEUTROPHILS # BLD AUTO: 3.66 K/UL — SIGNIFICANT CHANGE UP (ref 1.8–7.4)
NEUTROPHILS NFR BLD AUTO: 75.6 % — SIGNIFICANT CHANGE UP (ref 43–77)
PHOSPHATE SERPL-MCNC: 2.6 MG/DL — SIGNIFICANT CHANGE UP (ref 2.5–4.5)
PLATELET # BLD AUTO: 130 K/UL — LOW (ref 150–400)
POTASSIUM SERPL-MCNC: 3.7 MMOL/L — SIGNIFICANT CHANGE UP (ref 3.5–5.3)
POTASSIUM SERPL-SCNC: 3.7 MMOL/L — SIGNIFICANT CHANGE UP (ref 3.5–5.3)
PROT SERPL-MCNC: 6.1 GM/DL — SIGNIFICANT CHANGE UP (ref 6–8.3)
RBC # BLD: 3.53 M/UL — LOW (ref 3.8–5.2)
RBC # FLD: 12.1 % — SIGNIFICANT CHANGE UP (ref 10.3–14.5)
SODIUM SERPL-SCNC: 141 MMOL/L — SIGNIFICANT CHANGE UP (ref 135–145)
SPECIMEN SOURCE: SIGNIFICANT CHANGE UP
WBC # BLD: 4.84 K/UL — SIGNIFICANT CHANGE UP (ref 3.8–10.5)
WBC # FLD AUTO: 4.84 K/UL — SIGNIFICANT CHANGE UP (ref 3.8–10.5)

## 2024-05-16 PROCEDURE — 99233 SBSQ HOSP IP/OBS HIGH 50: CPT

## 2024-05-16 RX ADMIN — PIPERACILLIN AND TAZOBACTAM 25 GRAM(S): 4; .5 INJECTION, POWDER, LYOPHILIZED, FOR SOLUTION INTRAVENOUS at 13:07

## 2024-05-16 RX ADMIN — PIPERACILLIN AND TAZOBACTAM 25 GRAM(S): 4; .5 INJECTION, POWDER, LYOPHILIZED, FOR SOLUTION INTRAVENOUS at 21:26

## 2024-05-16 RX ADMIN — PIPERACILLIN AND TAZOBACTAM 25 GRAM(S): 4; .5 INJECTION, POWDER, LYOPHILIZED, FOR SOLUTION INTRAVENOUS at 06:18

## 2024-05-16 RX ADMIN — SODIUM CHLORIDE 100 MILLILITER(S): 9 INJECTION INTRAMUSCULAR; INTRAVENOUS; SUBCUTANEOUS at 21:25

## 2024-05-16 RX ADMIN — SODIUM CHLORIDE 100 MILLILITER(S): 9 INJECTION INTRAMUSCULAR; INTRAVENOUS; SUBCUTANEOUS at 06:19

## 2024-05-16 NOTE — PROGRESS NOTE ADULT - ASSESSMENT
Acute complicated sigmoid diverticulitis, with microperforation and sub-cm abscesses.     Rec:  -can advance to full liq tonight if ok with surgery, then can advance to LR diet in AM   -cont IV zosyn  -does not need surgery or IR drain at the moment  -does not need repeat CT, can re-eval daily if worsens for need for repeat imaging  -had outpt colonoscopy 3 m ago, does not need to repeat post resolution of diverticulitis   -d/w pt in detail

## 2024-05-16 NOTE — CONSULT NOTE ADULT - ASSESSMENT
Interventional Radiology    Evaluate for Procedure: Drainage of diverticular abscess     HPI: 59y Female with diverticular abscess. IR consulted for drainage.     Allergies: No Known Allergies    Medications (Abx/Cardiac/Anticoagulation/Blood Products)    piperacillin/tazobactam IVPB..: 25 mL/Hr IV Intermittent (05-16 @ 06:18)  piperacillin/tazobactam IVPB...: 200 mL/Hr IV Intermittent (05-14 @ 22:14)    Data:    T(C): 36.7  HR: 66  BP: 111/62  RR: 18  SpO2: 100%    -WBC 4.84 / HgB 10.4 / Hct 31.8 / Plt 130  -Na 141 / Cl 113 / BUN 8 / Glucose 56  -K 3.7 / CO2 17 / Cr 0.57  -ALT 22 / Alk Phos 74 / T.Bili 0.7  -INR 1.06 / PTT 34.1        Assessment/Plan:  59y Female with diverticular abscess. IR consulted for drainage.     - Imaging reviewed with Dr. Pham, no drainable collection noted.   - D/w Surgery resident.   
Acute complicated sigmoid diverticulitis, with microperforation and sub-cm abscesses.     Rec:  -NPO  -IV zosyn  -pain meds prn  -does not need surgery or IR drain at the moment  -does not need repeat CT at the moment, can re-eval daily if worsens for need for repeat imaging  -d/w pt and  in detail

## 2024-05-16 NOTE — PROGRESS NOTE ADULT - TIME BILLING
Feels improved. Pain present but decreasing.  Passing flatus.    Vital Signs Last 24 Hrs  T(C): 36.7 (16 May 2024 09:10), Max: 36.7 (16 May 2024 09:10)  T(F): 98 (16 May 2024 09:10), Max: 98 (16 May 2024 09:10)  HR: 66 (16 May 2024 09:10) (66 - 83)  BP: 111/62 (16 May 2024 09:10) (102/52 - 113/57)  BP(mean): 70 (15 May 2024 15:30) (70 - 70)  RR: 18 (16 May 2024 09:10) (18 - 18)  SpO2: 100% (16 May 2024 09:10) (98% - 100%)    NAD  soft, mild tenderness, ND                          10.4   4.84  )-----------( 130      ( 16 May 2024 07:45 )             31.8   05-16    141  |  113<H>  |  8   ----------------------------<  56<L>  3.7   |  17<L>  |  0.57    Ca    8.6      16 May 2024 07:45  Phos  2.6     05-16  Mg     1.9     05-16    TPro  6.1  /  Alb  3.0<L>  /  TBili  0.7  /  DBili  x   /  AST  20  /  ALT  22  /  AlkPhos  74  05-16      A/P -     Advance to clear liquid diet.  Cont IVF and abx.
Patient seen and examined, chart reviewed.  59yoF admitted with outside CT showing sigmoid diverticulitis with multiple small abscesses.  First episode of diverticulitis.  She feels that her abdominal discomfort has improved substantially since admission.  On exam abdomen is soft, nondistended, tender to palpation in LLQ, no guarding, mild rebound tenderness.  Labs overnight with leukocytosis to 12 - no labs yet this morning.    -- Recheck labs this morning and tomorrow morning  -- NPO given continued tenderness  -- IV fluids  -- IV antibiotics  -- Serial abdominal exams  -- Encourage ambulation  -- Follow up uploading of outside CT scan for review

## 2024-05-16 NOTE — PROGRESS NOTE ADULT - ASSESSMENT
58 yo with acute complicated diverticulitis     CT scan done outside facility report thickened sigmoid colon with diverticula, multiple subcentimeter abscess and microperforation       Plan   Advance diet as tolerated   IVF   Pain control and antiemetic prn   DVT ppx   OOB ambulation   Serial abd examination       Discussed with Attending  58 yo with acute complicated diverticulitis     CT scan done outside facility report thickened sigmoid colon with diverticula, multiple subcentimeter abscess and microperforation       Plan   Advance diet as tolerated   IVF   Cont abx   Pain control and antiemetic prn   DVT ppx   OOB ambulation   Serial abd examination       Discussed with Attending

## 2024-05-17 ENCOUNTER — TRANSCRIPTION ENCOUNTER (OUTPATIENT)
Age: 59
End: 2024-05-17

## 2024-05-17 VITALS
DIASTOLIC BLOOD PRESSURE: 64 MMHG | TEMPERATURE: 99 F | SYSTOLIC BLOOD PRESSURE: 110 MMHG | OXYGEN SATURATION: 99 % | RESPIRATION RATE: 18 BRPM | HEART RATE: 63 BPM

## 2024-05-17 LAB
ALBUMIN SERPL ELPH-MCNC: 2.8 G/DL — LOW (ref 3.3–5)
ALP SERPL-CCNC: 67 U/L — SIGNIFICANT CHANGE UP (ref 40–120)
ALT FLD-CCNC: 22 U/L — SIGNIFICANT CHANGE UP (ref 12–78)
ANION GAP SERPL CALC-SCNC: 5 MMOL/L — SIGNIFICANT CHANGE UP (ref 5–17)
AST SERPL-CCNC: 19 U/L — SIGNIFICANT CHANGE UP (ref 15–37)
BASOPHILS # BLD AUTO: 0 K/UL — SIGNIFICANT CHANGE UP (ref 0–0.2)
BASOPHILS NFR BLD AUTO: 0 % — SIGNIFICANT CHANGE UP (ref 0–2)
BILIRUB SERPL-MCNC: 0.5 MG/DL — SIGNIFICANT CHANGE UP (ref 0.2–1.2)
BUN SERPL-MCNC: 4 MG/DL — LOW (ref 7–23)
CALCIUM SERPL-MCNC: 8.6 MG/DL — SIGNIFICANT CHANGE UP (ref 8.5–10.1)
CHLORIDE SERPL-SCNC: 116 MMOL/L — HIGH (ref 96–108)
CO2 SERPL-SCNC: 24 MMOL/L — SIGNIFICANT CHANGE UP (ref 22–31)
CREAT SERPL-MCNC: 0.59 MG/DL — SIGNIFICANT CHANGE UP (ref 0.5–1.3)
EGFR: 105 ML/MIN/1.73M2 — SIGNIFICANT CHANGE UP
EOSINOPHIL # BLD AUTO: 0.11 K/UL — SIGNIFICANT CHANGE UP (ref 0–0.5)
EOSINOPHIL NFR BLD AUTO: 4 % — SIGNIFICANT CHANGE UP (ref 0–6)
GLUCOSE SERPL-MCNC: 88 MG/DL — SIGNIFICANT CHANGE UP (ref 70–99)
HCT VFR BLD CALC: 28.8 % — LOW (ref 34.5–45)
HGB BLD-MCNC: 9.4 G/DL — LOW (ref 11.5–15.5)
LYMPHOCYTES # BLD AUTO: 0.92 K/UL — LOW (ref 1–3.3)
LYMPHOCYTES # BLD AUTO: 33 % — SIGNIFICANT CHANGE UP (ref 13–44)
MAGNESIUM SERPL-MCNC: 1.8 MG/DL — SIGNIFICANT CHANGE UP (ref 1.6–2.6)
MCHC RBC-ENTMCNC: 28.7 PG — SIGNIFICANT CHANGE UP (ref 27–34)
MCHC RBC-ENTMCNC: 32.6 GM/DL — SIGNIFICANT CHANGE UP (ref 32–36)
MCV RBC AUTO: 88.1 FL — SIGNIFICANT CHANGE UP (ref 80–100)
MONOCYTES # BLD AUTO: 0.08 K/UL — SIGNIFICANT CHANGE UP (ref 0–0.9)
MONOCYTES NFR BLD AUTO: 3 % — SIGNIFICANT CHANGE UP (ref 2–14)
NEUTROPHILS # BLD AUTO: 1.67 K/UL — LOW (ref 1.8–7.4)
NEUTROPHILS NFR BLD AUTO: 60 % — SIGNIFICANT CHANGE UP (ref 43–77)
NRBC # BLD: SIGNIFICANT CHANGE UP /100 WBCS (ref 0–0)
PHOSPHATE SERPL-MCNC: 2.5 MG/DL — SIGNIFICANT CHANGE UP (ref 2.5–4.5)
PLATELET # BLD AUTO: 122 K/UL — LOW (ref 150–400)
POTASSIUM SERPL-MCNC: 3.6 MMOL/L — SIGNIFICANT CHANGE UP (ref 3.5–5.3)
POTASSIUM SERPL-SCNC: 3.6 MMOL/L — SIGNIFICANT CHANGE UP (ref 3.5–5.3)
PROT SERPL-MCNC: 5.8 GM/DL — LOW (ref 6–8.3)
RBC # BLD: 3.27 M/UL — LOW (ref 3.8–5.2)
RBC # FLD: 12.1 % — SIGNIFICANT CHANGE UP (ref 10.3–14.5)
SODIUM SERPL-SCNC: 145 MMOL/L — SIGNIFICANT CHANGE UP (ref 135–145)
WBC # BLD: 2.79 K/UL — LOW (ref 3.8–10.5)
WBC # FLD AUTO: 2.79 K/UL — LOW (ref 3.8–10.5)

## 2024-05-17 PROCEDURE — 99233 SBSQ HOSP IP/OBS HIGH 50: CPT

## 2024-05-17 RX ADMIN — PIPERACILLIN AND TAZOBACTAM 25 GRAM(S): 4; .5 INJECTION, POWDER, LYOPHILIZED, FOR SOLUTION INTRAVENOUS at 13:07

## 2024-05-17 RX ADMIN — PIPERACILLIN AND TAZOBACTAM 25 GRAM(S): 4; .5 INJECTION, POWDER, LYOPHILIZED, FOR SOLUTION INTRAVENOUS at 05:24

## 2024-05-17 NOTE — PROGRESS NOTE ADULT - SUBJECTIVE AND OBJECTIVE BOX
GI    HPI:  less pain today  no vomiting  remains npo      ROS  As above  Otherwise unremarkable, all systems reviewed    PE:  Vital Signs Last 24 Hrs  T(C): 36.3 (15 May 2024 15:30), Max: 37.3 (14 May 2024 21:14)  T(F): 97.4 (15 May 2024 15:30), Max: 99.1 (14 May 2024 21:14)  HR: 70 (15 May 2024 15:30) (65 - 75)  BP: 113/57 (15 May 2024 15:30) (112/70 - 138/54)  BP(mean): 70 (15 May 2024 15:30) (70 - 82)  RR: 18 (15 May 2024 15:30) (16 - 18)  SpO2: 98% (15 May 2024 15:30) (95% - 98%)    Parameters below as of 15 May 2024 15:30  Patient On (Oxygen Delivery Method): room air      Constitutional: NAD, well-developed, A+Ox3  Anicteric   Respiratory: CTABL, breathing comfortably  Cardiovascular: S1 and S2, RRR  Gastrointestinal: +BS, soft, +suprapubic and LLQ tenderness, non distended, no mass  Extremities: warm, well perfused, no edema  Psychiatric: Normal mood, normal affect  Neuro: moves all extremities, grossly intact  Skin: No rashes or lesions    LABS:                                           10.4   8.80  )-----------( 132      ( 15 May 2024 09:44 )             31.9   
Patient seen and examined on routine rounds.   She denies nausea, vomiting, fever or chills. abd pain has improved   Nurse report no acute event overnight   VS reviewed    PHYSICAL EXAM:  - GENERAL: Alert and oriented x 3. No acute distress.  - EYES: EOMI. Anicteric.  - HENT: Moist mucous membranes. No scleral icterus. No cervical lymphadenopathy.  - LUNGS: Clear to auscultation bilaterally. No accessory muscle use.  - CARDIOVASCULAR: Regular rate and rhythm. No murmur. No JVD.  - ABDOMEN: Soft, non tender and non-distended. No palpable masses.  - EXTREMITIES: No edema. Non-tender.  - SKIN: No rashes or lesions. Warm.  - NEUROLOGIC: No focal neurological deficits. CN II-XII grossly intact, but not individually tested.  - PSYCHIATRIC: Cooperative. Appropriate mood and affect.    Vital Signs Last 24 Hrs  T(C): 36.3 (15 May 2024 15:30), Max: 37.2 (15 May 2024 01:21)  T(F): 97.4 (15 May 2024 15:30), Max: 98.9 (15 May 2024 01:21)  HR: 70 (15 May 2024 15:30) (65 - 70)  BP: 113/57 (15 May 2024 15:30) (112/70 - 138/54)  BP(mean): 70 (15 May 2024 15:30) (70 - 82)  RR: 18 (15 May 2024 15:30) (18 - 18)  SpO2: 98% (15 May 2024 15:30) (95% - 98%)    Parameters below as of 15 May 2024 15:30  Patient On (Oxygen Delivery Method): room air    MEDICATIONS  (STANDING):  enoxaparin Injectable 40 milliGRAM(s) SubCutaneous every 24 hours  piperacillin/tazobactam IVPB.. 3.375 Gram(s) IV Intermittent every 8 hours  sodium chloride 0.9%. 1000 milliLiter(s) (100 mL/Hr) IV Continuous <Continuous>    MEDICATIONS  (PRN):  acetaminophen     Tablet .. 650 milliGRAM(s) Oral every 6 hours PRN Mild Pain (1 - 3)  acetaminophen   IVPB .. 1000 milliGRAM(s) IV Intermittent once PRN Temp greater or equal to 38C (100.4F), Mild Pain (1 - 3)  ketorolac   Injectable 15 milliGRAM(s) IV Push every 6 hours PRN Moderate Pain (4 - 6)  morphine  - Injectable 2 milliGRAM(s) IV Push every 4 hours PRN Severe Pain (7 - 10)  ondansetron Injectable 4 milliGRAM(s) IV Push every 6 hours PRN Nausea and/or Vomiting                          10.4   8.80  )-----------( 132      ( 15 May 2024 09:44 )             31.9     05-15    139  |  110<H>  |  7   ----------------------------<  89  3.4<L>   |  22  |  0.59    Ca    8.9      15 May 2024 09:44  Phos  3.1     05-15  Mg     2.0     05-15    TPro  6.5  /  Alb  3.2<L>  /  TBili  1.1  /  DBili  x   /  AST  21  /  ALT  24  /  AlkPhos  79  05-15  
  GI    HPI:  less pain today  no vomiting  sheeba clears  +stool    ROS  As above  Otherwise unremarkable, all systems reviewed    PE:  Vital Signs Last 24 Hrs  T(C): 37.1 (16 May 2024 15:42), Max: 37.1 (16 May 2024 15:42)  T(F): 98.8 (16 May 2024 15:42), Max: 98.8 (16 May 2024 15:42)  HR: 62 (16 May 2024 15:42) (62 - 83)  BP: 120/64 (16 May 2024 15:42) (102/52 - 120/64)  BP(mean): 77 (16 May 2024 15:42) (77 - 77)  RR: 18 (16 May 2024 15:42) (18 - 18)  SpO2: 100% (16 May 2024 15:42) (98% - 100%)    Parameters below as of 16 May 2024 15:42  Patient On (Oxygen Delivery Method): room air      Constitutional: NAD, well-developed, A+Ox3  Anicteric   Respiratory: CTABL, breathing comfortably  Cardiovascular: S1 and S2, RRR  Gastrointestinal: +BS, soft, +minimal suprapubic tenderness, non distended, no mass  Extremities: warm, well perfused, no edema  Psychiatric: Normal mood, normal affect  Neuro: moves all extremities, grossly intact  Skin: No rashes or lesions    LABS:                             10.4   4.84  )-----------( 130      ( 16 May 2024 07:45 )             31.8   
Patient seen and examined on routine rounds.   She denies nausea, vomiting, fever or chills  Nurse report no acute event overnight   VS reviewed    PHYSICAL EXAM:  - GENERAL: Alert and oriented x 3. No acute distress.  - EYES: EOMI. Anicteric.  - HENT: Moist mucous membranes. No scleral icterus. No cervical lymphadenopathy.  - LUNGS: Clear to auscultation bilaterally. No accessory muscle use.  - CARDIOVASCULAR: Regular rate and rhythm. No murmur. No JVD.  - ABDOMEN: Soft, suprapupic/LLQ TTP, no guarding and non-distended. No palpable masses.  - EXTREMITIES: No edema. Non-tender.  - SKIN: No rashes or lesions. Warm.  - NEUROLOGIC: No focal neurological deficits. CN II-XII grossly intact, but not individually tested.  - PSYCHIATRIC: Cooperative. Appropriate mood and affect.    Vital Signs Last 24 Hrs  T(C): 36.6 (15 May 2024 02:12), Max: 37.3 (14 May 2024 21:14)  T(F): 97.9 (15 May 2024 02:12), Max: 99.1 (14 May 2024 21:14)  HR: 65 (15 May 2024 02:12) (65 - 75)  BP: 138/54 (15 May 2024 02:12) (112/70 - 138/54)  BP(mean): 82 (15 May 2024 01:21) (82 - 82)  RR: 18 (15 May 2024 02:12) (16 - 18)  SpO2: 98% (15 May 2024 02:12) (95% - 98%)    Parameters below as of 15 May 2024 02:12  Patient On (Oxygen Delivery Method): room air                          12.3   12.11 )-----------( 172      ( 14 May 2024 21:15 )             37.4   05-14    136  |  104  |  10  ----------------------------<  104<H>  3.7   |  26  |  0.79    Ca    9.8      14 May 2024 21:15  Mg     2.0     05-14    TPro  7.8  /  Alb  4.1  /  TBili  1.0  /  DBili  x   /  AST  31  /  ALT  35  /  AlkPhos  100  05-14    MEDICATIONS  (STANDING):  enoxaparin Injectable 40 milliGRAM(s) SubCutaneous every 24 hours  piperacillin/tazobactam IVPB.. 3.375 Gram(s) IV Intermittent every 8 hours  sodium chloride 0.9%. 1000 milliLiter(s) (100 mL/Hr) IV Continuous <Continuous>    MEDICATIONS  (PRN):  acetaminophen   IVPB .. 1000 milliGRAM(s) IV Intermittent once PRN Temp greater or equal to 38C (100.4F), Mild Pain (1 - 3)  ketorolac   Injectable 15 milliGRAM(s) IV Push every 6 hours PRN Moderate Pain (4 - 6)  morphine  - Injectable 2 milliGRAM(s) IV Push every 4 hours PRN Severe Pain (7 - 10)  ondansetron Injectable 4 milliGRAM(s) IV Push every 6 hours PRN Nausea and/or Vomiting  
Patient seen and examined on routine rounds.   She denies nausea, vomiting, fever or chills. abd pain has improved. Tolerated liquid diet   Nurse report no acute event overnight   VS reviewed    PHYSICAL EXAM:  - GENERAL: Alert and oriented x 3. No acute distress.  - EYES: EOMI. Anicteric.  - HENT: Moist mucous membranes. No scleral icterus. No cervical lymphadenopathy.  - LUNGS: Clear to auscultation bilaterally. No accessory muscle use.  - CARDIOVASCULAR: Regular rate and rhythm. No murmur. No JVD.  - ABDOMEN: Soft, non tender and non-distended. No palpable masses.  - EXTREMITIES: No edema. Non-tender.  - SKIN: No rashes or lesions. Warm.  - NEUROLOGIC: No focal neurological deficits. CN II-XII grossly intact, but not individually tested.  - PSYCHIATRIC: Cooperative. Appropriate mood and affect.    Vital Signs Last 24 Hrs  T(C): 36.3 (16 May 2024 23:51), Max: 37.1 (16 May 2024 15:42)  T(F): 97.3 (16 May 2024 23:51), Max: 98.8 (16 May 2024 15:42)  HR: 60 (16 May 2024 23:51) (60 - 83)  BP: 110/49 (16 May 2024 23:51) (102/52 - 120/64)  BP(mean): 77 (16 May 2024 15:42) (77 - 77)  RR: 18 (16 May 2024 23:51) (18 - 18)  SpO2: 99% (16 May 2024 23:51) (98% - 100%)    Parameters below as of 16 May 2024 15:42  Patient On (Oxygen Delivery Method): room air    MEDICATIONS  (STANDING):  enoxaparin Injectable 40 milliGRAM(s) SubCutaneous every 24 hours  piperacillin/tazobactam IVPB.. 3.375 Gram(s) IV Intermittent every 8 hours  sodium chloride 0.9%. 1000 milliLiter(s) (100 mL/Hr) IV Continuous <Continuous>    MEDICATIONS  (PRN):  acetaminophen     Tablet .. 650 milliGRAM(s) Oral every 6 hours PRN Mild Pain (1 - 3)  acetaminophen   IVPB .. 1000 milliGRAM(s) IV Intermittent once PRN Temp greater or equal to 38C (100.4F), Mild Pain (1 - 3)  ketorolac   Injectable 15 milliGRAM(s) IV Push every 6 hours PRN Moderate Pain (4 - 6)  morphine  - Injectable 2 milliGRAM(s) IV Push every 4 hours PRN Severe Pain (7 - 10)  ondansetron Injectable 4 milliGRAM(s) IV Push every 6 hours PRN Nausea and/or Vomiting                          10.4   4.84  )-----------( 130      ( 16 May 2024 07:45 )             31.8     05-16    141  |  113<H>  |  8   ----------------------------<  56<L>  3.7   |  17<L>  |  0.57    Ca    8.6      16 May 2024 07:45  Phos  2.6     05-16  Mg     1.9     05-16    TPro  6.1  /  Alb  3.0<L>  /  TBili  0.7  /  DBili  x   /  AST  20  /  ALT  22  /  AlkPhos  74  05-16  
  GI    HPI:  feels well  minimal pain  no vomiting  sheeba LR diet  +stool    ROS  As above  Otherwise unremarkable, all systems reviewed    PE:  Vital Signs Last 24 Hrs  T(C): 37.1 (17 May 2024 16:36), Max: 37.1 (17 May 2024 08:24)  T(F): 98.8 (17 May 2024 16:36), Max: 98.8 (17 May 2024 08:24)  HR: 63 (17 May 2024 16:36) (60 - 63)  BP: 110/64 (17 May 2024 16:36) (110/49 - 119/71)  BP(mean): --  RR: 18 (17 May 2024 16:36) (18 - 18)  SpO2: 99% (17 May 2024 16:36) (99% - 99%)    Parameters below as of 17 May 2024 16:36  Patient On (Oxygen Delivery Method): room air      BF at bedside  Constitutional: NAD, well-developed, A+Ox3  Anicteric   Respiratory: CTABL, breathing comfortably  Cardiovascular: S1 and S2, RRR  Gastrointestinal: +BS, soft, +minimal suprapubic tenderness, non distended, no mass  Extremities: warm, well perfused, no edema  Psychiatric: Normal mood, normal affect  Neuro: moves all extremities, grossly intact  Skin: No rashes or lesions    LABS:

## 2024-05-17 NOTE — PROGRESS NOTE ADULT - PROVIDER SPECIALTY LIST ADULT
Colorectal Surgery
Colorectal Surgery
Gastroenterology
Colorectal Surgery
Gastroenterology
Gastroenterology

## 2024-05-17 NOTE — PROGRESS NOTE ADULT - ASSESSMENT
58 yo with acute complicated diverticulitis     CT scan done outside facility report thickened sigmoid colon with diverticula, multiple subcentimeter abscess and microperforation       Plan   Advance diet as tolerated   Cont abx   Pain control and antiemetic prn   DVT ppx   OOB ambulation   Serial abd examination       Discussed with Attending

## 2024-05-17 NOTE — DISCHARGE NOTE NURSING/CASE MANAGEMENT/SOCIAL WORK - PATIENT PORTAL LINK FT
You can access the FollowMyHealth Patient Portal offered by [facility] by registering at the following website: [email or web address]. By joining [facility]’s FollowMyHealth portal, you will also be able to view your health information using other applications (apps) compatible with our system.

## 2024-05-17 NOTE — CDI QUERY NOTE - NSCDIOTHERTXTBX2_GEN_ALL_CORE_FT
Clinical documentation -hematology  indicates that this patient has decreased white blood cells, red blood cels, hemoglobin, hematocrit and platelets  without an associated diagnosis.In order to accurately capture this lab finding to the greatest degree of specificity reflecting the patient’s actual severity of illness please document the diagnosis, if known, associated with the below values & clinical evidence:    -Pancytopenia drug induced  -Pancytopenia other etiology  -Other anemia please specify  -Other please specify      Supporting Documentation and/or Clinical Evidence:    WBC Count: 2.79 K/uL (05.17.24 @ 06:20)   WBC Count: 4.84 K/uL (05.16.24 @ 07:45)   WBC Count: 8.80 K/uL (05.15.24 @ 09:44)   WBC Count: 12.11 K/uL (05.14.24 @ 21:15)     RBC Count: 3.27 M/uL (05.17.24 @ 06:20)   RBC Count: 3.53 M/uL (05.16.24 @ 07:45)   RBC Count: 3.62 M/uL (05.15.24 @ 09:44)   RBC Count: 4.27 M/uL (05.14.24 @ 21:15)     Hemoglobin: 9.4 g/dL (05.17.24 @ 06:20)   Hemoglobin: 10.4 g/dL (05.16.24 @ 07:45)   Hemoglobin: 10.4 g/dL (05.15.24 @ 09:44)   Hemoglobin: 12.3 g/dL (05.14.24 @ 21:15)     Hematocrit: 28.8 % (05.17.24 @ 06:20)   Hematocrit: 31.8 % (05.16.24 @ 07:45)   Hematocrit: 31.9 % (05.15.24 @ 09:44)   Hematocrit: 37.4 % (05.14.24 @ 21:15)     Platelet Count - Automated: 122 K/uL (05.17.24 @ 06:20)   Platelet Count - Automated: 130 K/uL (05.16.24 @ 07:45)   Platelet Count - Automated: 132 K/uL (05.15.24 @ 09:44)   Platelet Count - Automated: 172 K/uL (05.14.24 @ 21:15)     DC summary-PRINCIPAL DISCHARGE DIAGNOSIS  Diverticulitis of large intestine with perforation and abscess

## 2024-05-17 NOTE — DISCHARGE NOTE PROVIDER - CARE PROVIDER_API CALL
Christy Barry  Colon/Rectal Surgery  [address], [place] [address]  Phone: [phone]  Fax: [phone]  Established Patient  Follow Up Time: 2 weeks

## 2024-05-17 NOTE — PROGRESS NOTE ADULT - ASSESSMENT
Acute complicated sigmoid diverticulitis, with microperforation and sub-cm abscesses.     Rec:  -LR diet  -PO augmentin x 10 additional days for d/c home tonight  -outpt f/u 1 week  -had outpt colonoscopy 3 m ago, does not need to repeat post resolution of diverticulitis   -d/w pt in detail

## 2024-05-17 NOTE — DISCHARGE NOTE PROVIDER - HOSPITAL COURSE
??59F w/ diverticulitis. Abd soft, ND, pain much improved. Tolerating LRD. Stable for discharge on PO abx.

## 2024-05-17 NOTE — DISCHARGE NOTE PROVIDER - NSDCCPCAREPLAN_GEN_ALL_CORE_FT
PRINCIPAL DISCHARGE DIAGNOSIS  Diagnosis: Diverticulitis of large intestine with perforation and abscess  Assessment and Plan of Treatment:

## 2024-05-17 NOTE — DISCHARGE NOTE PROVIDER - NSDCMRMEDTOKEN_GEN_ALL_CORE_FT
Acidophilus oral capsule: 1 cap(s) orally once a day  Airsupra 90 mcg-80 mcg/inh inhalation aerosol: 2 puff(s) inhaled 4 times a day as needed for  shortness of breath and/or wheezing  MegaKrill oral capsule: 1 cap(s) orally once a day  turmeric 500 mg oral capsule: 1 cap(s) orally once a day  Vitamin C with Roshni Hips 500 mg oral tablet: 1 tab(s) orally once a day  Vitamin D3 50 mcg (2000 intl units) oral capsule: 2 cap(s) orally once a day

## 2024-05-17 NOTE — PROGRESS NOTE ADULT - ATTENDING COMMENTS
Patient seen and examined this morning.  She was admitted with a sigmoid diverticulitis with small abscesses.  Has done well with antibiotics and bowel rest and is now tolerating diet.  She has no additional pain on exam.  Her abdominal exam is benign, evaluation.  Plan to discharge home with completion of course of oral antibiotics.  She will follow-up with Dr. Fisher as an outpatient.
S&E

## 2024-05-17 NOTE — DISCHARGE NOTE NURSING/CASE MANAGEMENT/SOCIAL WORK - NSDCPEFALRISK_GEN_ALL_CORE
For information on Fall & Injury Prevention, visit: [email or web address] OR  [email or web address] OR  https://www.cdc.gov/steadi/patient.html

## 2024-05-17 NOTE — CDI QUERY NOTE - NSCDIOTHERTXTBX_GEN_ALL_CORE_HH
The patient is admitted and documented with acute complicated Diverticulitis of large intestine with perforation and abscess and discharged on PO abx.- Could you pleases further document the more specific  location of the abscesses :    -Intra abdominal abscess/ abscesses  -Intra peritoneal abscess/ abscesses   -pelvic abscess  -other please specify       Chart documentation and clinical evidence    H&P- diverticulosis present to the ED with  suprapubic and LLQ pain of 1 day duration. Pain is constant, non radiating, associated with subjective fever and chills.    -CT scan done outside facility report thickened sigmoid colon with diverticula, multiple subcentimeter abscess and microperforation     -IR-Evaluate for Procedure: Drainage of diverticular abscess -    piperacillin/tazobactam IVPB..: 25 mL/Hr IV Intermittent (05-16 @ 06:18)  piperacillin/tazobactam IVPB...: 200 mL/Hr IV Intermittent (05-14 @ 22:14)

## 2024-05-20 LAB
CULTURE RESULTS: SIGNIFICANT CHANGE UP
CULTURE RESULTS: SIGNIFICANT CHANGE UP
SPECIMEN SOURCE: SIGNIFICANT CHANGE UP
SPECIMEN SOURCE: SIGNIFICANT CHANGE UP

## 2024-05-23 DIAGNOSIS — K65.1 PERITONEAL ABSCESS: ICD-10-CM

## 2024-05-23 DIAGNOSIS — K57.20 DIVERTICULITIS OF LARGE INTESTINE WITH PERFORATION AND ABSCESS WITHOUT BLEEDING: ICD-10-CM

## 2024-05-23 DIAGNOSIS — D61.818 OTHER PANCYTOPENIA: ICD-10-CM

## 2024-10-28 ENCOUNTER — TRANSCRIPTION ENCOUNTER (OUTPATIENT)
Age: 59
End: 2024-10-28

## 2025-01-16 ENCOUNTER — NON-APPOINTMENT (OUTPATIENT)
Age: 60
End: 2025-01-16